# Patient Record
Sex: MALE | Race: WHITE | NOT HISPANIC OR LATINO | Employment: OTHER | URBAN - METROPOLITAN AREA
[De-identification: names, ages, dates, MRNs, and addresses within clinical notes are randomized per-mention and may not be internally consistent; named-entity substitution may affect disease eponyms.]

---

## 2024-11-06 ENCOUNTER — PREP FOR SURGERY (OUTPATIENT)
Dept: OTHER | Facility: HOSPITAL | Age: 89
End: 2024-11-06
Payer: MEDICARE

## 2024-11-06 NOTE — H&P
Hospitalist Note:    Transfer to [facility] requested by APRN at [facility] for rate controlled Afib and femoral neck fracture. Cardiology (Julio) and ortho (Johnny) agreed to see patient in consultation. Ortho would like a CT when patient gets here. Per referring APRN pt stable, Hgb 12.5, cr 1.27, CXR neg.    Electronically signed by Artem Dunham MD, 11/06/24, 4:48 PM EST.

## 2024-11-07 ENCOUNTER — APPOINTMENT (OUTPATIENT)
Dept: GENERAL RADIOLOGY | Facility: HOSPITAL | Age: 89
End: 2024-11-07
Payer: MEDICARE

## 2024-11-07 ENCOUNTER — ANESTHESIA (OUTPATIENT)
Dept: PERIOP | Facility: HOSPITAL | Age: 89
End: 2024-11-07
Payer: MEDICARE

## 2024-11-07 ENCOUNTER — HOSPITAL ENCOUNTER (INPATIENT)
Facility: HOSPITAL | Age: 89
LOS: 5 days | Discharge: ANOTHER HOSPITAL | End: 2024-11-12
Attending: INTERNAL MEDICINE | Admitting: FAMILY MEDICINE
Payer: MEDICARE

## 2024-11-07 ENCOUNTER — PREP FOR SURGERY (OUTPATIENT)
Dept: OTHER | Facility: HOSPITAL | Age: 89
End: 2024-11-07
Payer: MEDICARE

## 2024-11-07 ENCOUNTER — APPOINTMENT (OUTPATIENT)
Dept: CT IMAGING | Facility: HOSPITAL | Age: 89
End: 2024-11-07
Payer: MEDICARE

## 2024-11-07 ENCOUNTER — ANESTHESIA EVENT (OUTPATIENT)
Dept: PERIOP | Facility: HOSPITAL | Age: 89
End: 2024-11-07
Payer: MEDICARE

## 2024-11-07 ENCOUNTER — APPOINTMENT (OUTPATIENT)
Dept: CARDIOLOGY | Facility: HOSPITAL | Age: 89
End: 2024-11-07
Payer: MEDICARE

## 2024-11-07 DIAGNOSIS — R26.2 DIFFICULTY IN WALKING: Primary | ICD-10-CM

## 2024-11-07 DIAGNOSIS — F41.9 ANXIETY DISORDER, UNSPECIFIED TYPE: ICD-10-CM

## 2024-11-07 DIAGNOSIS — S72.001A CLOSED FRACTURE OF NECK OF RIGHT FEMUR, INITIAL ENCOUNTER: ICD-10-CM

## 2024-11-07 DIAGNOSIS — Z78.9 DECREASED ACTIVITIES OF DAILY LIVING (ADL): ICD-10-CM

## 2024-11-07 DIAGNOSIS — S72.001A CLOSED FRACTURE OF NECK OF RIGHT FEMUR, INITIAL ENCOUNTER: Primary | ICD-10-CM

## 2024-11-07 PROBLEM — I10 ESSENTIAL HYPERTENSION: Status: ACTIVE | Noted: 2024-11-07

## 2024-11-07 PROBLEM — S72.009A FEMORAL NECK FRACTURE: Status: ACTIVE | Noted: 2024-11-07

## 2024-11-07 PROBLEM — N40.0 BPH (BENIGN PROSTATIC HYPERPLASIA): Status: ACTIVE | Noted: 2024-11-07

## 2024-11-07 LAB
ALBUMIN SERPL-MCNC: 3 G/DL (ref 3.5–5.2)
ALBUMIN/GLOB SERPL: 1 G/DL
ALP SERPL-CCNC: 57 U/L (ref 39–117)
ALT SERPL W P-5'-P-CCNC: 11 U/L (ref 1–41)
ANION GAP SERPL CALCULATED.3IONS-SCNC: 11.7 MMOL/L (ref 5–15)
AORTIC DIMENSIONLESS INDEX: 0.43 (DI)
AST SERPL-CCNC: 22 U/L (ref 1–40)
BASOPHILS # BLD AUTO: 0.08 10*3/MM3 (ref 0–0.2)
BASOPHILS NFR BLD AUTO: 0.7 % (ref 0–1.5)
BH CV ECHO MEAS - AO MAX PG: 19.4 MMHG
BH CV ECHO MEAS - AO MEAN PG: 10.5 MMHG
BH CV ECHO MEAS - AO V2 MAX: 220.3 CM/SEC
BH CV ECHO MEAS - AO V2 VTI: 44.5 CM
BH CV ECHO MEAS - EDV(MOD-SP2): 103.4 ML
BH CV ECHO MEAS - EDV(MOD-SP4): 102.6 ML
BH CV ECHO MEAS - EF(MOD-SP2): 59.2 %
BH CV ECHO MEAS - EF(MOD-SP4): 67.9 %
BH CV ECHO MEAS - ESV(MOD-SP2): 42.2 ML
BH CV ECHO MEAS - ESV(MOD-SP4): 32.9 ML
BH CV ECHO MEAS - LAT PEAK E' VEL: 10.5 CM/SEC
BH CV ECHO MEAS - LV MAX PG: 3.2 MMHG
BH CV ECHO MEAS - LV MEAN PG: 2.2 MMHG
BH CV ECHO MEAS - LV V1 MAX: 90 CM/SEC
BH CV ECHO MEAS - LV V1 VTI: 19.2 CM
BH CV ECHO MEAS - LVOT DIAM: 2.1 CM
BH CV ECHO MEAS - MED PEAK E' VEL: 9.4 CM/SEC
BH CV ECHO MEAS - MV A MAX VEL: 106.9 CM/SEC
BH CV ECHO MEAS - MV E MAX VEL: 75.1 CM/SEC
BH CV ECHO MEAS - MV E/A: 0.7
BH CV ECHO MEAS - SV(MOD-SP2): 61.2 ML
BH CV ECHO MEAS - SV(MOD-SP4): 69.7 ML
BH CV ECHO MEAS - TAPSE (>1.6): 2.6 CM
BH CV ECHO MEASUREMENTS AVERAGE E/E' RATIO: 7.55
BH CV XLRA - TDI S': 14.9 CM/SEC
BILIRUB SERPL-MCNC: 0.8 MG/DL (ref 0–1.2)
BUN SERPL-MCNC: 35 MG/DL (ref 8–23)
BUN/CREAT SERPL: 26.1 (ref 7–25)
CALCIUM SPEC-SCNC: 8.8 MG/DL (ref 8.2–9.6)
CHLORIDE SERPL-SCNC: 102 MMOL/L (ref 98–107)
CO2 SERPL-SCNC: 21.3 MMOL/L (ref 22–29)
CREAT SERPL-MCNC: 1.34 MG/DL (ref 0.76–1.27)
DEPRECATED RDW RBC AUTO: 42.6 FL (ref 37–54)
EGFRCR SERPLBLD CKD-EPI 2021: 50.3 ML/MIN/1.73
EOSINOPHIL # BLD AUTO: 0.14 10*3/MM3 (ref 0–0.4)
EOSINOPHIL NFR BLD AUTO: 1.2 % (ref 0.3–6.2)
ERYTHROCYTE [DISTWIDTH] IN BLOOD BY AUTOMATED COUNT: 13.5 % (ref 12.3–15.4)
GLOBULIN UR ELPH-MCNC: 3.1 GM/DL
GLUCOSE SERPL-MCNC: 116 MG/DL (ref 65–99)
HCT VFR BLD AUTO: 36.1 % (ref 37.5–51)
HGB BLD-MCNC: 11.6 G/DL (ref 13–17.7)
IMM GRANULOCYTES # BLD AUTO: 0.48 10*3/MM3 (ref 0–0.05)
IMM GRANULOCYTES NFR BLD AUTO: 4.2 % (ref 0–0.5)
INR PPP: 1.17 (ref 0.86–1.15)
IVRT: 89 MS
LEFT ATRIUM VOLUME INDEX: 22.4 ML/M2
LV EF 2D ECHO EST: 55 %
LYMPHOCYTES # BLD AUTO: 0.97 10*3/MM3 (ref 0.7–3.1)
LYMPHOCYTES NFR BLD AUTO: 8.5 % (ref 19.6–45.3)
MCH RBC QN AUTO: 28 PG (ref 26.6–33)
MCHC RBC AUTO-ENTMCNC: 32.1 G/DL (ref 31.5–35.7)
MCV RBC AUTO: 87.2 FL (ref 79–97)
MONOCYTES # BLD AUTO: 1.53 10*3/MM3 (ref 0.1–0.9)
MONOCYTES NFR BLD AUTO: 13.5 % (ref 5–12)
NEUTROPHILS NFR BLD AUTO: 71.9 % (ref 42.7–76)
NEUTROPHILS NFR BLD AUTO: 8.16 10*3/MM3 (ref 1.7–7)
NRBC BLD AUTO-RTO: 0 /100 WBC (ref 0–0.2)
PLATELET # BLD AUTO: 206 10*3/MM3 (ref 140–450)
PMV BLD AUTO: 9.4 FL (ref 6–12)
POTASSIUM SERPL-SCNC: 4.1 MMOL/L (ref 3.5–5.2)
PROT SERPL-MCNC: 6.1 G/DL (ref 6–8.5)
PROTHROMBIN TIME: 15.1 SECONDS (ref 11.8–14.9)
QT INTERVAL: 425 MS
QTC INTERVAL: 453 MS
RBC # BLD AUTO: 4.14 10*6/MM3 (ref 4.14–5.8)
SODIUM SERPL-SCNC: 135 MMOL/L (ref 136–145)
WBC NRBC COR # BLD AUTO: 11.36 10*3/MM3 (ref 3.4–10.8)

## 2024-11-07 PROCEDURE — 27245 TREAT THIGH FRACTURE: CPT | Performed by: ORTHOPAEDIC SURGERY

## 2024-11-07 PROCEDURE — 25010000002 DEXAMETHASONE PER 1 MG: Performed by: MARRIAGE & FAMILY THERAPIST

## 2024-11-07 PROCEDURE — C1769 GUIDE WIRE: HCPCS | Performed by: ORTHOPAEDIC SURGERY

## 2024-11-07 PROCEDURE — 25010000002 PROPOFOL 10 MG/ML EMULSION: Performed by: MARRIAGE & FAMILY THERAPIST

## 2024-11-07 PROCEDURE — 27245 TREAT THIGH FRACTURE: CPT

## 2024-11-07 PROCEDURE — 25010000002 LIDOCAINE PF 2% 2 % SOLUTION: Performed by: MARRIAGE & FAMILY THERAPIST

## 2024-11-07 PROCEDURE — 25010000002 FENTANYL CITRATE (PF) 50 MCG/ML SOLUTION: Performed by: MARRIAGE & FAMILY THERAPIST

## 2024-11-07 PROCEDURE — 25010000002 CEFAZOLIN PER 500 MG: Performed by: ORTHOPAEDIC SURGERY

## 2024-11-07 PROCEDURE — 93005 ELECTROCARDIOGRAM TRACING: CPT | Performed by: FAMILY MEDICINE

## 2024-11-07 PROCEDURE — 71045 X-RAY EXAM CHEST 1 VIEW: CPT

## 2024-11-07 PROCEDURE — 85025 COMPLETE CBC W/AUTO DIFF WBC: CPT | Performed by: FAMILY MEDICINE

## 2024-11-07 PROCEDURE — 25810000003 LACTATED RINGERS PER 1000 ML: Performed by: FAMILY MEDICINE

## 2024-11-07 PROCEDURE — 0QS636Z REPOSITION RIGHT UPPER FEMUR WITH INTRAMEDULLARY INTERNAL FIXATION DEVICE, PERCUTANEOUS APPROACH: ICD-10-PCS | Performed by: ORTHOPAEDIC SURGERY

## 2024-11-07 PROCEDURE — C1713 ANCHOR/SCREW BN/BN,TIS/BN: HCPCS | Performed by: ORTHOPAEDIC SURGERY

## 2024-11-07 PROCEDURE — 99222 1ST HOSP IP/OBS MODERATE 55: CPT | Performed by: ORTHOPAEDIC SURGERY

## 2024-11-07 PROCEDURE — 80053 COMPREHEN METABOLIC PANEL: CPT | Performed by: FAMILY MEDICINE

## 2024-11-07 PROCEDURE — 93306 TTE W/DOPPLER COMPLETE: CPT

## 2024-11-07 PROCEDURE — 94799 UNLISTED PULMONARY SVC/PX: CPT

## 2024-11-07 PROCEDURE — 25010000002 ONDANSETRON PER 1 MG: Performed by: MARRIAGE & FAMILY THERAPIST

## 2024-11-07 PROCEDURE — 85610 PROTHROMBIN TIME: CPT | Performed by: FAMILY MEDICINE

## 2024-11-07 PROCEDURE — 25010000002 SUGAMMADEX 200 MG/2ML SOLUTION: Performed by: MARRIAGE & FAMILY THERAPIST

## 2024-11-07 PROCEDURE — 76000 FLUOROSCOPY <1 HR PHYS/QHP: CPT

## 2024-11-07 PROCEDURE — 99223 1ST HOSP IP/OBS HIGH 75: CPT | Performed by: FAMILY MEDICINE

## 2024-11-07 PROCEDURE — 93306 TTE W/DOPPLER COMPLETE: CPT | Performed by: SPECIALIST

## 2024-11-07 PROCEDURE — 25010000002 HYDROMORPHONE 1 MG/ML SOLUTION: Performed by: MARRIAGE & FAMILY THERAPIST

## 2024-11-07 PROCEDURE — 73700 CT LOWER EXTREMITY W/O DYE: CPT

## 2024-11-07 PROCEDURE — 25810000003 LACTATED RINGERS PER 1000 ML: Performed by: ANESTHESIOLOGY

## 2024-11-07 PROCEDURE — 25810000003 LACTATED RINGERS PER 1000 ML: Performed by: ORTHOPAEDIC SURGERY

## 2024-11-07 DEVICE — ZNN CMN NAIL 13MMX21.5CM 125R
Type: IMPLANTABLE DEVICE | Site: HIP | Status: FUNCTIONAL
Brand: ZIMMER® NATURAL NAIL® SYSTEM

## 2024-11-07 DEVICE — SCRW CORT FA FUL/THRD HEX3.5 TI 5X40MM: Type: IMPLANTABLE DEVICE | Site: HIP | Status: FUNCTIONAL

## 2024-11-07 DEVICE — ZNN CMN LAG SCREW 10.5X110
Type: IMPLANTABLE DEVICE | Site: HIP | Status: FUNCTIONAL
Brand: ZIMMER® NATURAL NAIL® SYSTEM

## 2024-11-07 RX ORDER — ESCITALOPRAM OXALATE 10 MG/1
10 TABLET ORAL DAILY
COMMUNITY

## 2024-11-07 RX ORDER — SODIUM CHLORIDE 0.9 % (FLUSH) 0.9 %
10 SYRINGE (ML) INJECTION EVERY 12 HOURS SCHEDULED
Status: DISCONTINUED | OUTPATIENT
Start: 2024-11-07 | End: 2024-11-12 | Stop reason: HOSPADM

## 2024-11-07 RX ORDER — MORPHINE SULFATE 2 MG/ML
1 INJECTION, SOLUTION INTRAMUSCULAR; INTRAVENOUS EVERY 4 HOURS PRN
Status: DISCONTINUED | OUTPATIENT
Start: 2024-11-07 | End: 2024-11-12 | Stop reason: HOSPADM

## 2024-11-07 RX ORDER — NALOXONE HCL 0.4 MG/ML
0.4 VIAL (ML) INJECTION
Status: DISCONTINUED | OUTPATIENT
Start: 2024-11-07 | End: 2024-11-12 | Stop reason: HOSPADM

## 2024-11-07 RX ORDER — ONDANSETRON 2 MG/ML
4 INJECTION INTRAMUSCULAR; INTRAVENOUS ONCE AS NEEDED
Status: DISCONTINUED | OUTPATIENT
Start: 2024-11-07 | End: 2024-11-07 | Stop reason: HOSPADM

## 2024-11-07 RX ORDER — TAMSULOSIN HYDROCHLORIDE 0.4 MG/1
0.4 CAPSULE ORAL NIGHTLY
Status: DISCONTINUED | OUTPATIENT
Start: 2024-11-07 | End: 2024-11-12 | Stop reason: HOSPADM

## 2024-11-07 RX ORDER — OXYCODONE HYDROCHLORIDE 5 MG/1
5 TABLET ORAL
Status: DISCONTINUED | OUTPATIENT
Start: 2024-11-07 | End: 2024-11-07 | Stop reason: HOSPADM

## 2024-11-07 RX ORDER — MEPERIDINE HYDROCHLORIDE 25 MG/ML
12.5 INJECTION INTRAMUSCULAR; INTRAVENOUS; SUBCUTANEOUS
Status: DISCONTINUED | OUTPATIENT
Start: 2024-11-07 | End: 2024-11-07 | Stop reason: HOSPADM

## 2024-11-07 RX ORDER — BISACODYL 10 MG
10 SUPPOSITORY, RECTAL RECTAL DAILY PRN
Status: DISCONTINUED | OUTPATIENT
Start: 2024-11-07 | End: 2024-11-07

## 2024-11-07 RX ORDER — SODIUM CHLORIDE 0.9 % (FLUSH) 0.9 %
10 SYRINGE (ML) INJECTION AS NEEDED
Status: DISCONTINUED | OUTPATIENT
Start: 2024-11-07 | End: 2024-11-07 | Stop reason: HOSPADM

## 2024-11-07 RX ORDER — SODIUM CHLORIDE, SODIUM LACTATE, POTASSIUM CHLORIDE, CALCIUM CHLORIDE 600; 310; 30; 20 MG/100ML; MG/100ML; MG/100ML; MG/100ML
75 INJECTION, SOLUTION INTRAVENOUS CONTINUOUS
Status: ACTIVE | OUTPATIENT
Start: 2024-11-07 | End: 2024-11-07

## 2024-11-07 RX ORDER — PROPOFOL 10 MG/ML
VIAL (ML) INTRAVENOUS AS NEEDED
Status: DISCONTINUED | OUTPATIENT
Start: 2024-11-07 | End: 2024-11-07 | Stop reason: SURG

## 2024-11-07 RX ORDER — KETOROLAC TROMETHAMINE 15 MG/ML
15 INJECTION, SOLUTION INTRAMUSCULAR; INTRAVENOUS EVERY 6 HOURS PRN
Status: DISCONTINUED | OUTPATIENT
Start: 2024-11-07 | End: 2024-11-08

## 2024-11-07 RX ORDER — AMOXICILLIN 250 MG
2 CAPSULE ORAL 2 TIMES DAILY PRN
Status: DISCONTINUED | OUTPATIENT
Start: 2024-11-07 | End: 2024-11-12 | Stop reason: HOSPADM

## 2024-11-07 RX ORDER — BISACODYL 10 MG
10 SUPPOSITORY, RECTAL RECTAL DAILY PRN
Status: DISCONTINUED | OUTPATIENT
Start: 2024-11-07 | End: 2024-11-12 | Stop reason: HOSPADM

## 2024-11-07 RX ORDER — SODIUM CHLORIDE 9 MG/ML
40 INJECTION, SOLUTION INTRAVENOUS AS NEEDED
Status: DISCONTINUED | OUTPATIENT
Start: 2024-11-07 | End: 2024-11-12 | Stop reason: HOSPADM

## 2024-11-07 RX ORDER — POLYETHYLENE GLYCOL 3350 17 G/17G
17 POWDER, FOR SOLUTION ORAL DAILY PRN
Status: DISCONTINUED | OUTPATIENT
Start: 2024-11-07 | End: 2024-11-07

## 2024-11-07 RX ORDER — ONDANSETRON 4 MG/1
4 TABLET, ORALLY DISINTEGRATING ORAL EVERY 6 HOURS PRN
Status: DISCONTINUED | OUTPATIENT
Start: 2024-11-07 | End: 2024-11-12 | Stop reason: HOSPADM

## 2024-11-07 RX ORDER — PROMETHAZINE HYDROCHLORIDE 12.5 MG/1
25 TABLET ORAL ONCE AS NEEDED
Status: DISCONTINUED | OUTPATIENT
Start: 2024-11-07 | End: 2024-11-07 | Stop reason: HOSPADM

## 2024-11-07 RX ORDER — ONDANSETRON 2 MG/ML
4 INJECTION INTRAMUSCULAR; INTRAVENOUS EVERY 6 HOURS PRN
Status: DISCONTINUED | OUTPATIENT
Start: 2024-11-07 | End: 2024-11-12 | Stop reason: HOSPADM

## 2024-11-07 RX ORDER — BISACODYL 5 MG/1
5 TABLET, DELAYED RELEASE ORAL DAILY PRN
Status: DISCONTINUED | OUTPATIENT
Start: 2024-11-07 | End: 2024-11-07

## 2024-11-07 RX ORDER — MIDAZOLAM HYDROCHLORIDE 2 MG/2ML
2 INJECTION, SOLUTION INTRAMUSCULAR; INTRAVENOUS ONCE
Status: DISCONTINUED | OUTPATIENT
Start: 2024-11-07 | End: 2024-11-07 | Stop reason: HOSPADM

## 2024-11-07 RX ORDER — MORPHINE SULFATE 2 MG/ML
2 INJECTION, SOLUTION INTRAMUSCULAR; INTRAVENOUS
Status: DISCONTINUED | OUTPATIENT
Start: 2024-11-07 | End: 2024-11-07

## 2024-11-07 RX ORDER — SODIUM CHLORIDE 9 MG/ML
40 INJECTION, SOLUTION INTRAVENOUS AS NEEDED
Status: DISCONTINUED | OUTPATIENT
Start: 2024-11-07 | End: 2024-11-07 | Stop reason: HOSPADM

## 2024-11-07 RX ORDER — ONDANSETRON 2 MG/ML
INJECTION INTRAMUSCULAR; INTRAVENOUS AS NEEDED
Status: DISCONTINUED | OUTPATIENT
Start: 2024-11-07 | End: 2024-11-07 | Stop reason: SURG

## 2024-11-07 RX ORDER — BISACODYL 5 MG/1
5 TABLET, DELAYED RELEASE ORAL DAILY PRN
Status: DISCONTINUED | OUTPATIENT
Start: 2024-11-07 | End: 2024-11-12 | Stop reason: HOSPADM

## 2024-11-07 RX ORDER — AMOXICILLIN 250 MG
2 CAPSULE ORAL 2 TIMES DAILY
Status: DISCONTINUED | OUTPATIENT
Start: 2024-11-07 | End: 2024-11-12 | Stop reason: HOSPADM

## 2024-11-07 RX ORDER — PROMETHAZINE HYDROCHLORIDE 25 MG/1
25 SUPPOSITORY RECTAL ONCE AS NEEDED
Status: DISCONTINUED | OUTPATIENT
Start: 2024-11-07 | End: 2024-11-07 | Stop reason: HOSPADM

## 2024-11-07 RX ORDER — HEPARIN SODIUM 5000 [USP'U]/ML
5000 INJECTION, SOLUTION INTRAVENOUS; SUBCUTANEOUS EVERY 12 HOURS SCHEDULED
Status: DISCONTINUED | OUTPATIENT
Start: 2024-11-07 | End: 2024-11-08

## 2024-11-07 RX ORDER — DEXAMETHASONE SODIUM PHOSPHATE 4 MG/ML
INJECTION, SOLUTION INTRA-ARTICULAR; INTRALESIONAL; INTRAMUSCULAR; INTRAVENOUS; SOFT TISSUE AS NEEDED
Status: DISCONTINUED | OUTPATIENT
Start: 2024-11-07 | End: 2024-11-07 | Stop reason: SURG

## 2024-11-07 RX ORDER — FUROSEMIDE 20 MG/1
20 TABLET ORAL DAILY
COMMUNITY

## 2024-11-07 RX ORDER — FENTANYL CITRATE 50 UG/ML
INJECTION, SOLUTION INTRAMUSCULAR; INTRAVENOUS AS NEEDED
Status: DISCONTINUED | OUTPATIENT
Start: 2024-11-07 | End: 2024-11-07 | Stop reason: SURG

## 2024-11-07 RX ORDER — LISINOPRIL 5 MG/1
5 TABLET ORAL DAILY
COMMUNITY
End: 2024-11-12 | Stop reason: HOSPADM

## 2024-11-07 RX ORDER — TAMSULOSIN HYDROCHLORIDE 0.4 MG/1
1 CAPSULE ORAL DAILY
COMMUNITY

## 2024-11-07 RX ORDER — SODIUM CHLORIDE 0.9 % (FLUSH) 0.9 %
10 SYRINGE (ML) INJECTION AS NEEDED
Status: DISCONTINUED | OUTPATIENT
Start: 2024-11-07 | End: 2024-11-12 | Stop reason: HOSPADM

## 2024-11-07 RX ORDER — ACETAMINOPHEN 500 MG
1000 TABLET ORAL ONCE
Status: COMPLETED | OUTPATIENT
Start: 2024-11-07 | End: 2024-11-07

## 2024-11-07 RX ORDER — POLYETHYLENE GLYCOL 3350 17 G/17G
17 POWDER, FOR SOLUTION ORAL DAILY PRN
Status: DISCONTINUED | OUTPATIENT
Start: 2024-11-07 | End: 2024-11-12 | Stop reason: HOSPADM

## 2024-11-07 RX ORDER — ROCURONIUM BROMIDE 10 MG/ML
INJECTION, SOLUTION INTRAVENOUS AS NEEDED
Status: DISCONTINUED | OUTPATIENT
Start: 2024-11-07 | End: 2024-11-07 | Stop reason: SURG

## 2024-11-07 RX ORDER — LIDOCAINE HYDROCHLORIDE 20 MG/ML
INJECTION, SOLUTION EPIDURAL; INFILTRATION; INTRACAUDAL; PERINEURAL AS NEEDED
Status: DISCONTINUED | OUTPATIENT
Start: 2024-11-07 | End: 2024-11-07 | Stop reason: SURG

## 2024-11-07 RX ORDER — POTASSIUM CHLORIDE 750 MG/1
10 TABLET, EXTENDED RELEASE ORAL DAILY
COMMUNITY
End: 2024-11-12 | Stop reason: HOSPADM

## 2024-11-07 RX ORDER — PHENYLEPHRINE HCL IN 0.9% NACL 1 MG/10 ML
SYRINGE (ML) INTRAVENOUS AS NEEDED
Status: DISCONTINUED | OUTPATIENT
Start: 2024-11-07 | End: 2024-11-07 | Stop reason: SURG

## 2024-11-07 RX ORDER — ONDANSETRON 2 MG/ML
4 INJECTION INTRAMUSCULAR; INTRAVENOUS EVERY 6 HOURS PRN
Status: DISCONTINUED | OUTPATIENT
Start: 2024-11-07 | End: 2024-11-07

## 2024-11-07 RX ORDER — ESCITALOPRAM OXALATE 10 MG/1
10 TABLET ORAL DAILY
Status: DISCONTINUED | OUTPATIENT
Start: 2024-11-08 | End: 2024-11-12 | Stop reason: HOSPADM

## 2024-11-07 RX ORDER — SODIUM CHLORIDE, SODIUM LACTATE, POTASSIUM CHLORIDE, CALCIUM CHLORIDE 600; 310; 30; 20 MG/100ML; MG/100ML; MG/100ML; MG/100ML
20 INJECTION, SOLUTION INTRAVENOUS CONTINUOUS PRN
Status: ACTIVE | OUTPATIENT
Start: 2024-11-07 | End: 2024-11-08

## 2024-11-07 RX ADMIN — LIDOCAINE HYDROCHLORIDE 50 MG: 20 INJECTION, SOLUTION INTRAVENOUS at 10:47

## 2024-11-07 RX ADMIN — SODIUM CHLORIDE, POTASSIUM CHLORIDE, SODIUM LACTATE AND CALCIUM CHLORIDE 75 ML/HR: 600; 310; 30; 20 INJECTION, SOLUTION INTRAVENOUS at 14:39

## 2024-11-07 RX ADMIN — Medication 10 ML: at 20:52

## 2024-11-07 RX ADMIN — SODIUM CHLORIDE 2 G: 9 INJECTION, SOLUTION INTRAVENOUS at 10:48

## 2024-11-07 RX ADMIN — Medication 100 MCG: at 11:28

## 2024-11-07 RX ADMIN — Medication 100 MCG: at 11:23

## 2024-11-07 RX ADMIN — SENNOSIDES AND DOCUSATE SODIUM 2 TABLET: 50; 8.6 TABLET ORAL at 20:52

## 2024-11-07 RX ADMIN — PROPOFOL 90 MG: 10 INJECTION, EMULSION INTRAVENOUS at 10:47

## 2024-11-07 RX ADMIN — ROCURONIUM BROMIDE 50 MG: 10 INJECTION, SOLUTION INTRAVENOUS at 10:47

## 2024-11-07 RX ADMIN — DEXAMETHASONE SODIUM PHOSPHATE 4 MG: 4 INJECTION, SOLUTION INTRAMUSCULAR; INTRAVENOUS at 10:47

## 2024-11-07 RX ADMIN — SODIUM CHLORIDE, POTASSIUM CHLORIDE, SODIUM LACTATE AND CALCIUM CHLORIDE 75 ML/HR: 600; 310; 30; 20 INJECTION, SOLUTION INTRAVENOUS at 05:23

## 2024-11-07 RX ADMIN — ONDANSETRON HYDROCHLORIDE 4 MG: 2 SOLUTION INTRAMUSCULAR; INTRAVENOUS at 11:41

## 2024-11-07 RX ADMIN — FENTANYL CITRATE 50 MCG: 50 INJECTION, SOLUTION INTRAMUSCULAR; INTRAVENOUS at 10:47

## 2024-11-07 RX ADMIN — Medication 10 ML: at 14:59

## 2024-11-07 RX ADMIN — SODIUM CHLORIDE, POTASSIUM CHLORIDE, SODIUM LACTATE AND CALCIUM CHLORIDE: 600; 310; 30; 20 INJECTION, SOLUTION INTRAVENOUS at 10:38

## 2024-11-07 RX ADMIN — TAMSULOSIN HYDROCHLORIDE 0.4 MG: 0.4 CAPSULE ORAL at 20:52

## 2024-11-07 RX ADMIN — ACETAMINOPHEN 1000 MG: 500 TABLET ORAL at 09:02

## 2024-11-07 RX ADMIN — SUGAMMADEX 200 MG: 100 INJECTION, SOLUTION INTRAVENOUS at 11:41

## 2024-11-07 RX ADMIN — FENTANYL CITRATE 50 MCG: 50 INJECTION, SOLUTION INTRAMUSCULAR; INTRAVENOUS at 11:08

## 2024-11-07 RX ADMIN — HYDROMORPHONE HYDROCHLORIDE 0.5 MG: 1 INJECTION, SOLUTION INTRAMUSCULAR; INTRAVENOUS; SUBCUTANEOUS at 12:19

## 2024-11-07 NOTE — ANESTHESIA PREPROCEDURE EVALUATION
Anesthesia Evaluation     Patient summary reviewed and Nursing notes reviewed   no history of anesthetic complications:   NPO Solid Status: > 8 hours  NPO Liquid Status: > 2 hours           Airway   Mallampati: I  TM distance: >3 FB  Neck ROM: full  No difficulty expected  Dental    (+) edentulous    Pulmonary - negative pulmonary ROS and normal exam    breath sounds clear to auscultation  Cardiovascular - negative cardio ROS and normal exam  Exercise tolerance: good (4-7 METS)    ECG reviewed  Rhythm: regular  Rate: normal    (+) hypertension      Neuro/Psych- negative ROS  GI/Hepatic/Renal/Endo - negative ROS     Musculoskeletal (-) negative ROS    Abdominal    Substance History - negative use     OB/GYN negative ob/gyn ROS         Other - negative ROS  arthritis,     ROS/Med Hx Other: PAT Nursing Notes unavailable.               Latest Reference Range & Units 11/07/24 02:58   Sodium 136 - 145 mmol/L 135 (L)   Potassium 3.5 - 5.2 mmol/L 4.1   Chloride 98 - 107 mmol/L 102   CO2 22.0 - 29.0 mmol/L 21.3 (L)   Anion Gap 5.0 - 15.0 mmol/L 11.7   BUN 8 - 23 mg/dL 35 (H)   Creatinine 0.76 - 1.27 mg/dL 1.34 (H)   BUN/Creatinine Ratio 7.0 - 25.0  26.1 (H)   eGFR >60.0 mL/min/1.73 50.3 (L)   Glucose 65 - 99 mg/dL 116 (H)   Calcium 8.2 - 9.6 mg/dL 8.8   Alkaline Phosphatase 39 - 117 U/L 57   Total Protein 6.0 - 8.5 g/dL 6.1   Albumin 3.5 - 5.2 g/dL 3.0 (L)   Globulin gm/dL 3.1   A/G Ratio g/dL 1.0   AST (SGOT) 1 - 40 U/L 22   ALT (SGPT) 1 - 41 U/L 11   Total Bilirubin 0.0 - 1.2 mg/dL 0.8   (L): Data is abnormally low  (H): Data is abnormally high     Latest Reference Range & Units 11/07/24 02:58   Protime 11.8 - 14.9 Seconds 15.1 (H)   INR 0.86 - 1.15  1.17 (H)   (H): Data is abnormally high     Latest Reference Range & Units 11/07/24 02:58   Hemoglobin 13.0 - 17.7 g/dL 11.6 (L)   Hematocrit 37.5 - 51.0 % 36.1 (L)   (L): Data is abnormally low    ABNORMAL ECG - 11/7/24  Sinus rhythm  Prolonged HI interval  Inferior infarct,  old    IMPRESSION: CT Scan  No acute infiltrate is appreciated. No cardiac enlargement. Please see  above comments for further detail.         Anesthesia Plan    ASA 3     general     Reason for not using neuraxial anesthesia or peripheral nerve block: Potential Difficulty Predicted  (Patient understands anesthesia not responsible for dental damage.  Regional anesthesia options discussed; patient is agreeable to regional block or general anesthesia but due to extremely bad hearing, pt may not be ideal candidate for regional due to inability to place in ideal position    EKG reveals q waves inferiorly, may indicate old inferior infarct in the past, labs indicate slight renal insufficiency)  intravenous induction     Anesthetic plan, risks, benefits, and alternatives have been provided, discussed and informed consent has been obtained with: patient.    Use of blood products discussed with patient .    Plan discussed with CRNA.        CODE STATUS:    Level Of Support Discussed With: Patient  Code Status (Patient has no pulse and is not breathing): CPR (Attempt to Resuscitate)  Medical Interventions (Patient has pulse or is breathing): Full Support

## 2024-11-07 NOTE — ANESTHESIA POSTPROCEDURE EVALUATION
Patient: Charbel Escoto    Procedure Summary       Date: 11/07/24 Room / Location: [facility] OR 03 / [facility] MAIN OR    Anesthesia Start: 1038 Anesthesia Stop: 1159    Procedure: HIP INTERTROCHANTERIC NAILING (Right: Hip) Diagnosis:       Closed fracture of neck of right femur, initial encounter      (Closed fracture of neck of right femur, initial encounter [S72.001A])    Surgeons: Jesus Turner MD Provider: Dottie Dallas MD    Anesthesia Type: general ASA Status: 3            Anesthesia Type: general    Vitals  Vitals Value Taken Time   /86 11/07/24 1306   Temp 36.6 °C (97.9 °F) 11/07/24 1235   Pulse 72 11/07/24 1307   Resp 14 11/07/24 1300   SpO2 92 % 11/07/24 1307   Vitals shown include unfiled device data.        Post Anesthesia Care and Evaluation    Patient location during evaluation: bedside  Patient participation: complete - patient participated  Level of consciousness: awake  Pain management: adequate    Airway patency: patent  PONV Status: none  Cardiovascular status: acceptable and stable  Respiratory status: acceptable  Hydration status: acceptable

## 2024-11-07 NOTE — CONSULTS
[facility]   Consult Note    Patient Name: Charbel Escoto  : 1934  MRN: [id number]  Primary Care Physician:  Dagmar Montez DO  Referring Physician: NGUYỄN Wade  Date of admission: 2024    Subjective   Subjective     Reason for Consult/ Chief Complaint: See H&P    HPI:  Charbel Escoto is a 89 y.o. male who sustained a hip fracture sometime the last 2 weeks is difficult to understand history apparently fell 2 weeks ago had an x-ray this past Wednesday which was read as normal in [place].  Apparently was unable to walk a deformity of his lower extremity was brought to emergency room diagnosed with a displaced inotrope fracture and I was consulted after he was transferred.    Review of Systems   14 Point ROS is negative except as noted above.     Personal History     Past Medical History:   Diagnosis Date   • Arthritis    • Hypertension        History reviewed. No pertinent surgical history.    Family History: family history is not on file. Otherwise pertinent FHx was reviewed and not pertinent to current issue.    Social History:  reports that he has never smoked. He has never used smokeless tobacco. He reports that he does not drink alcohol and does not use drugs.    Home Medications:  escitalopram, furosemide, lisinopril, potassium chloride, and tamsulosin    Allergies:  No Known Allergies    Objective    Objective     Vitals:   Temp:  [98.3 °F (36.8 °C)-99.3 °F (37.4 °C)] 98.3 °F (36.8 °C)  Heart Rate:  [77-80] 80  Resp:  [18-22] 22  BP: (110-148)/(72-74) 148/72    Physical Exam:   Constitutional: Awake, alert   HENT: Atraumatic, Normocephalic   Respiratory: Nonlabored respirations    Cardiovascular: Intact peripheral pulses    Musculoskeletal: Clavicles shoulders elbows wrist and hand are nontender full range of motion right lower extremity shortened external rotated compared to left left hip knee and ankles nontender right knee and ankles nontender     Imaging:  Imaging  Results (Last 24 Hours)       Procedure Component Value Units Date/Time    XR Chest 1 View [[id number]] Collected: 11/07/24 0633     Updated: 11/07/24 0637    Narrative:      XR CHEST 1 VW-     Date of exam: 11/7/2024 5:40 AM.     Indication: Preop. right LUDIVINA; h/o fall & right femoral neck fracture.     Comparison: None available.     FINDINGS:  Two views of the chest (AP upright portable projections) reveal no  cardiac enlargement and no acute infiltrate. No pleural effusion or  pneumothorax is seen. The thoracic aorta is atherosclerotic and ectatic.  External artifacts are noted. Chronic calcified granulomatous disease of  the chest is suggested. There are degenerative changes of the shoulders  and spine.          Impression:      No acute infiltrate is appreciated. No cardiac enlargement. Please see  above comments for further detail.           Portions of this note were completed with a voice recognition program.     Electronically Signed By-Aman Mojica MD On:11/7/2024 6:35 AM       CT Lower Extremity Right Without Contrast [[id number]] Collected: 11/07/24 0433     Updated: 11/07/24 0445    Narrative:      CT LOWER EXTREMITY RIGHT WO CONTRAST-     Date of exam: 11/7/2024, 4:04 A.M.     Indications: hip fracture; Orthopedics (requesting prior to surgery)     Comparison: None available.     TECHNIQUE:   Axial CT images were obtained of the right lower extremity without  contrast administration. Reconstructed 2D coronal and sagittal images  were also obtained. Automated exposure control and iterative  construction methods were used.     FINDINGS:  There is an acute posteriorly displaced (8 mm) comminuted  extra-articular closed right femoral neck fracture. Again, the large  distal fracture fragment is displaced posteriorly approximately 8 mm.  Also, the fracture is impacted. There is a vertical fracture line  extending through the intertrochanteric region to the subtrochanteric  area; it extends approximately 4 cm  inferior to the apex of the right  lesser trochanter. There is extensive comminution at the impaction site.  There may be a tiny avulsion fracture involving the lesser trochanter  (such as seen on image 66 of series 5 and image 56 of series 4). In the  differential diagnosis would be an avulsion fracture. No dislocation is  seen. There are suspected old healed superior and inferior ischio-pubic  rami fractures on the right. Acute soft tissue contusion is centered  about the fractures involving the proximal right femur.          Impression:      There is an acute comminuted displaced right femoral neck fracture, as  discussed. It is impacted. There is a fracture line extending into the  subtrochanteric region, as detailed above. No dislocation is seen.  Please see above comments for further detail.           Portions of this note were completed with a voice recognition program.     Electronically Signed By-Aman Mojica MD On:11/7/2024 4:43 AM                Result Review    Result Review:  I have personally reviewed the results from the time of this admission to 11/7/2024 09:40 EST and agree with these findings:  []  Laboratory  []  Microbiology  []  Radiology  []  EKG/Telemetry   []  Cardiology/Vascular   []  Pathology  []  Old records  []  Other:      Assessment & Plan   Assessment / Plan     Brief Patient Summary:  Charbel Escoto is a 89 y.o. male who sustained a displaced basicervical/intertrochanteric fracture with subtrochanteric extension    Active Hospital Problems:  Active Hospital Problems    Diagnosis    • **Femoral neck fracture    • Essential hypertension    • BPH (benign prostatic hyperplasia)        Plan: Discussed with him and son difficult decision making secondary location of this fracture with extension in the central region initial impression is to consider intramedullary screw fixation will make the final decision IntraOp after traction is applied the leg possibly need a total hip arthroplasty  they understand the risk and benefits of both surgical interventions and wishes to proceed.      Electronically signed by Jesus Turner MD, 11/07/24, 9:40 AM EST.

## 2024-11-07 NOTE — CONSULTS
[facility]   Cardiology Consult Note    Patient Name: Charbel Escoto  : 1934  MRN: [id number]  Primary Care Physician:  Dagmar Montez DO  Referring Physician: NGUYỄN Wade  Date of admission: 2024    Subjective   Subjective     Reason for Consult/ Chief Complaint: Atrial fibrillation    HPI:  Charbel Escoto is a 89 y.o. male admitted with right hip fracture.  Noticed to have atrial fibrillation on monitor.  Controlled.  No previous history.  No chest pain or shortness of breath.    Review of Systems:      Constitutional no fever,  no weight loss   Skin no rash   Otolaryngeal no difficulty swallowing   Cardiovascular See HPI   Pulmonary no cough, no sputum production                         Personal History       Past Medical/Surgical History:   Past Medical History:   Diagnosis Date   • Arthritis    • Hypertension      History reviewed. No pertinent surgical history.      Family History: No Family History of CAD.    Social History:  reports that he has never smoked. He has never used smokeless tobacco. He reports that he does not drink alcohol and does not use drugs.    Medications:  Medications Prior to Admission   Medication Sig Dispense Refill Last Dose/Taking   • escitalopram (LEXAPRO) 10 MG tablet Take 1 tablet by mouth Daily.      • furosemide (LASIX) 20 MG tablet Take 1 tablet by mouth Daily.      • lisinopril (PRINIVIL,ZESTRIL) 5 MG tablet Take 1 tablet by mouth Daily.      • potassium chloride (KLOR-CON M10) 10 MEQ CR tablet Take 1 tablet by mouth Daily.      • tamsulosin (FLOMAX) 0.4 MG capsule 24 hr capsule Take 1 capsule by mouth Daily.        Current medications:  ceFAZolin, 2 g, Intravenous, Once  ceFAZolin, 3 g, Intravenous, Once  heparin (porcine), 5,000 Units, Subcutaneous, Q12H  midazolam, 2 mg, Intravenous, Once  sodium chloride, 10 mL, Intravenous, Q12H      Current IV drips:  lactated ringers, 75 mL/hr, Last Rate: 75 mL/hr (24 0523)  lactated ringers,  "20 mL/hr        Allergies:  No Known Allergies    Objective    Objective     Vitals:   Temp:  [98.3 °F (36.8 °C)-99.3 °F (37.4 °C)] 98.3 °F (36.8 °C)  Heart Rate:  [77-80] 80  Resp:  [18-22] 22  BP: (110-148)/(72-74) 148/72      Physical Exam:    Constitutional: Awake, alert, No acute distress   Eyes: PERRLA, sclerae anicteric, no conjunctival injection  HENT: NCAT, mucous membranes moist  Neck: Supple, no thyromegaly, no lymphadenopathy, trachea midline  Respiratory: Clear to auscultation bilaterally, nonlabored respirations   Cardiovascular: Irregular heart sounds, no murmurs, rubs, or gallops, palpable pedal pulses bilaterally    Result Review    Result Review:  I have personally reviewed the results from the time of this admission to 11/7/2024 09:16 EST and agree with these findings:  [x]  Laboratory  [x]  EKG/Telemetry   [x]  Cardiology/Vascular   []  Pathology  [x]  Old records  [x]  Medications  Basic Metabolic Panel    Sodium Sodium   Date Value Ref Range Status   11/07/2024 135 (L) 136 - 145 mmol/L Final      Potassium Potassium   Date Value Ref Range Status   11/07/2024 4.1 3.5 - 5.2 mmol/L Final      Chloride Chloride   Date Value Ref Range Status   11/07/2024 102 98 - 107 mmol/L Final      Bicarbonate No results found for: \"PLASMABICARB\"   BUN BUN   Date Value Ref Range Status   11/07/2024 35 (H) 8 - 23 mg/dL Final      Creatinine Creatinine   Date Value Ref Range Status   11/07/2024 1.34 (H) 0.76 - 1.27 mg/dL Final      Calcium Calcium   Date Value Ref Range Status   11/07/2024 8.8 8.2 - 9.6 mg/dL Final      Glucose      No components found for: \"GLUCOSE.*\"        No results found for: \"PROBNP\"       Telemetry shows atrial fibrillation    Assessment / Plan     Impression:   1.  Persistent atrial fibrillation controlled.  2.  Right hip fracture.    Plan:   1.  Echocardiogram.  2.  Metoprolol if heart rate runs high.  3.  Eliquis 2.5 mg twice a day postsurgery.    Electronically signed by Ervin" MD Julio, 11/07/24, 9:16 AM EST.

## 2024-11-07 NOTE — PLAN OF CARE
Goal Outcome Evaluation:  Plan of Care Reviewed With: patient        Progress: no change  Outcome Evaluation: Direct admit on shift. Patient is A&O x4. [place]. VSS. No complaints from patient on shift. Family remained at bedside. Plan of care ongoing.

## 2024-11-07 NOTE — SIGNIFICANT NOTE
11/07/24 0545   OTHER   Discipline occupational therapist   Rehab Time/Intention   Session Not Performed   (pending ortho)

## 2024-11-07 NOTE — NURSING NOTE
Per cardiologist Dr. Ervin Kim , patient is cleared by cardiac standpoint to proceed with his surgery.

## 2024-11-07 NOTE — PLAN OF CARE
Goal Outcome Evaluation:  Plan of Care Reviewed With: patient        Progress: improving  Outcome Evaluation: Pt had hip nailing done today. Pt has had no complaints of pain this shift. Pt is alert and oriented. Family is bedside. Plan of care ongoing.

## 2024-11-07 NOTE — OP NOTE
HIP INTERTROCHANTERIC NAILING  Procedure Report    Patient Name:  Charbel Escoto  YOB: 1934    Date of Surgery:  11/7/2024       Pre-op Diagnosis:   Closed fracture of neck of right femur, initial encounter [S72.001A]       Post-Op Diagnosis Codes:     * Closed fracture of neck of right femur, initial encounter [S72.001A]    Procedure/CPT® Codes:      Procedure(s):  rightHIP INTERTROCHANTERIC NAILING    Staff:  Surgeon(s):  Jesus Turner MD    Assistant: Keiry Miner CSA; Kylee lAvarez RN    Anesthesia: Choice    Estimated Blood Loss: minimal    Implants:    Implant Name Type Inv. Item Serial No.  Lot No. LRB No. Used Action   NAIL IM/FEM CEPH TI 13MM 21.5CM SHT RT STRL - [id number] Implant NAIL IM/FEM CEPH TI 13MM 21.5CM SHT RT STRL  [employer] [id number] Right 1 Implanted   SCRW LAG CEPH TI [id number] - [id number] Implant SCRW LAG CEPH TI [id number]  [employer] [id number] Right 1 Implanted   SCRW SHERRIE FA FUL/THRD HEX3.5 TI 5X40MM - [id number] Implant SCRW SHERRIE FA FUL/THRD HEX3.5 TI 5X40MM  [employer] [id number] Right 1 Implanted       Specimen:          None      Complications: none    Description of Procedure: See H&P for risks and benefits. The patient was taken to the operating room and placed supine on the operating table.  After general anesthesia was established, the patient was placed on a fracture table.  After gentle retraction was placed on the lower extremity, C-arm shot showed anatomic alignment of the fracture on AP and lateral views.  The right hip and lower extremity were prepped and draped in a standard fashion using alcohol and ChloraPrep.  A standard incision was made just proximal to the greater trochanter approximately 4 cm in length and carried down to the subcutaneous tissue with a knife.  Dissection was carried down creating a soft tissue portal using curved Engel scissors, and then the curved awl was used to gain a starting point.  The guidewire was  passed down across the fracture site, and the one step reamer was used followed by passage of the 125 degree angle CM nail.  The guidewire was removed.  After seating it in appropriate position, the CM screw was placed using the guide with a separate stab incision and drilling the guide pin in and ensuring it was in appropriate position and reaming appropriate depth.  The appropriate length CM screw was placed.  The set screw was tightened, and then the static screw was filled in a standard fashion in the distal interlocking hole after creating a separate stab incision also using the guide.  The guide was removed after C-arm shots verified anatomic reduction and fixation of the fracture and excellent placement of the implants.  No complication.  All three wounds were copiously irrigated.  The deep fascia was closed with 0 Vicryl proximally.  The subcutaneous was all closed with 2-0 Vicryl.  The skin was all closed with staples.  Incisions were washed and dried and sterile dressing applied.  The patient tolerated the procedure well and was taken to the recovery room.         Jesus Turner MD     Date: 11/7/2024  Time: 11:45 EST

## 2024-11-07 NOTE — H&P
[facility]   HISTORY AND PHYSICAL    Patient Name: Charbel Escoto  : 1934  MRN: [id number]  Primary Care Physician:  Dagmar Montez DO  Date of admission: 2024    Subjective   Subjective     Chief Complaint: Right hip pain    HPI:    Charbel Escoto is a 89 y.o. male with with past medical history of hypertension, BPH, arthritis, hearing impairment and GERD was transferred to this facility from [facility] for management of a right hip fracture.  Per son at bedside, last week the patient was chasing a chicken in his backyard and he accidentally fell to the ground hitting his right knee and his head.  Patient had a contusion to the top of his head which resolved after a few days but then he began to have severe right lower extremity pain that limited his mobility and ambulation.  Patient went to PCPs office to have x-rays done which he read as negative but the pain persisted so he was taken to outside facility was found to have a right hip fracture.  Due to lack of orthopedic surgery patient was transferred here for higher level of care.  At our facility patient's vitals were all within normal on arrival.  Labs showed reduced renal function with a mild leukocytosis and elevated INR 1.17.  CT of the right lower extremity showed an acute comminuted displaced right femoral neck fracture.  When seen patient was complaining of right lower extremity pain but denied any recent fevers, chills, headaches, chest pain, shortness of breath, dysuria, or bleeding.  Patient denied being on blood thinners.    Review of Systems   All systems were reviewed and negative except for: As per HPI    Personal History     Past Medical History:   Diagnosis Date   • Arthritis    • Hypertension        History reviewed. No pertinent surgical history.    Family History: family history is not on file. Otherwise pertinent FHx was reviewed and not pertinent to current issue.    Social History:  reports that he has never  smoked. He has never used smokeless tobacco. He reports that he does not drink alcohol and does not use drugs.    Home Medications:         Allergies:  No Known Allergies    Objective   Objective     Vitals:   Temp:  [99.3 °F (37.4 °C)] 99.3 °F (37.4 °C)  Heart Rate:  [77] 77  Resp:  [18] 18  BP: (110)/(74) 110/74  Physical Exam    Constitutional: Awake, alert   Eyes: PERRLA, sclerae anicteric, no conjunctival injection   HENT: NCAT, mucous membranes dry   Neck: Supple, no thyromegaly, no lymphadenopathy, trachea midline   Respiratory: Clear to auscultation bilaterally, nonlabored respirations    Cardiovascular: RRR, no murmurs, rubs, or gallops, palpable pedal pulses bilaterally   Gastrointestinal: Positive bowel sounds, soft, nontender, nondistended   Musculoskeletal: Limited range of motion secondary to pain, bilateral ankle edema, no clubbing or cyanosis to extremities   Psychiatric: Appropriate affect, cooperative   Neurologic: Oriented x 3, strength symmetric in all extremities, Cranial Nerves grossly intact to confrontation, speech clear   Skin: No rashes     Result Review    Result Review:  I have personally reviewed the results from the time of this admission to 11/7/2024 05:08 EST and agree with these findings:  [x]  Laboratory list / accordion  []  Microbiology  [x]  Radiology  [x]  EKG/Telemetry   []  Cardiology/Vascular   []  Pathology  []  Old records  []  Other:  Most notable findings include: EKG showed sinus rhythm with prolonged UT interval but no significant ST changes, leukocytosis, reduced renal function, elevated INR      Assessment & Plan   Assessment / Plan     Brief Patient Summary:  Charbel Escoto is a 89 y.o. male with with past medical history of hypertension, BPH, arthritis, hearing impairment and GERD was transferred to this facility from [facility] for management of a right hip fracture.    Active Hospital Problems:  Active Hospital Problems    Diagnosis    • **Femoral neck  fracture    • Essential hypertension    • BPH (benign prostatic hyperplasia)      Plan:     Right Hip Fracture  -Admit to medical floor  -Imaging reviewed  -Pain Meds PRN  -Orthopedic Surgery Consulted  -NPO after midnight  -IVF  -PT/OT  -CBC, CMP, INR, EKG  -Cardiology risk assessment  -Supportive care    HTN  -Currently well controlled  -PRN BP meds  -Resume home meds when available  -Titrate if needed    BPH  Hearing impairment    GI ppx  DVT ppx      VTE Prophylaxis:  Pharmacologic VTE prophylaxis orders are present.        CODE STATUS:    Level Of Support Discussed With: Patient  Code Status (Patient has no pulse and is not breathing): CPR (Attempt to Resuscitate)  Medical Interventions (Patient has pulse or is breathing): Full Support    Admission Status:  I believe this patient meets inpatient status.      Electronically signed by Reginald Reid MD, 11/07/24, 5:08 AM EST.

## 2024-11-07 NOTE — PROGRESS NOTES
Patient seen and evaluated following right hip intertrochanteric nailing.  Patient difficult to awaken but once awake is alert conversant oriented to person alone.  Patient very hard of hearing.  Afebrile heart rate within normal limits.  A-fib 60s to 80s on telemetry reviewed.  Blood pressure within normal limits.  Satting well on 2 L nasal cannula postoperatively.  Transthoracic echocardiogram demonstrated normal left ventricular ejection fraction with mild aortic stenosis.      PT OT to evaluate and treat.    Continue pain control with p.o. analgesics IV for breakthrough.    Weightbearing and dressing changes per orthopedics  Continue home escitalopram  Hold home furosemide and lisinopril restart as needed  Continue home tamsulosin will attempt voiding trial in a.m.  Repeat H&H and renal panel in a.m.  Avoid nephrotoxic's and NSAIDs  Further inpatient orders recommendations pending clinical course

## 2024-11-08 LAB
ANION GAP SERPL CALCULATED.3IONS-SCNC: 8 MMOL/L (ref 5–15)
BUN SERPL-MCNC: 43 MG/DL (ref 8–23)
BUN/CREAT SERPL: 27.6 (ref 7–25)
CALCIUM SPEC-SCNC: 8.5 MG/DL (ref 8.2–9.6)
CHLORIDE SERPL-SCNC: 101 MMOL/L (ref 98–107)
CO2 SERPL-SCNC: 24 MMOL/L (ref 22–29)
CREAT SERPL-MCNC: 1.56 MG/DL (ref 0.76–1.27)
EGFRCR SERPLBLD CKD-EPI 2021: 41.9 ML/MIN/1.73
GLUCOSE SERPL-MCNC: 120 MG/DL (ref 65–99)
HCT VFR BLD AUTO: 34.3 % (ref 37.5–51)
HGB BLD-MCNC: 10.8 G/DL (ref 13–17.7)
POTASSIUM SERPL-SCNC: 4.5 MMOL/L (ref 3.5–5.2)
SODIUM SERPL-SCNC: 133 MMOL/L (ref 136–145)

## 2024-11-08 PROCEDURE — 97161 PT EVAL LOW COMPLEX 20 MIN: CPT

## 2024-11-08 PROCEDURE — 99232 SBSQ HOSP IP/OBS MODERATE 35: CPT | Performed by: SPECIALIST

## 2024-11-08 PROCEDURE — 25010000002 HEPARIN (PORCINE) PER 1000 UNITS: Performed by: ORTHOPAEDIC SURGERY

## 2024-11-08 PROCEDURE — 80048 BASIC METABOLIC PNL TOTAL CA: CPT | Performed by: ORTHOPAEDIC SURGERY

## 2024-11-08 PROCEDURE — 85018 HEMOGLOBIN: CPT | Performed by: ORTHOPAEDIC SURGERY

## 2024-11-08 PROCEDURE — 99232 SBSQ HOSP IP/OBS MODERATE 35: CPT | Performed by: FAMILY MEDICINE

## 2024-11-08 PROCEDURE — 85014 HEMATOCRIT: CPT | Performed by: ORTHOPAEDIC SURGERY

## 2024-11-08 PROCEDURE — 97165 OT EVAL LOW COMPLEX 30 MIN: CPT

## 2024-11-08 RX ORDER — ALPRAZOLAM 0.25 MG/1
0.25 TABLET ORAL 3 TIMES DAILY PRN
Status: DISCONTINUED | OUTPATIENT
Start: 2024-11-08 | End: 2024-11-08

## 2024-11-08 RX ORDER — NICOTINE 21 MG/24HR
1 PATCH, TRANSDERMAL 24 HOURS TRANSDERMAL
Status: DISCONTINUED | OUTPATIENT
Start: 2024-11-08 | End: 2024-11-12 | Stop reason: HOSPADM

## 2024-11-08 RX ORDER — ALPRAZOLAM 0.25 MG/1
0.5 TABLET ORAL 4 TIMES DAILY PRN
Status: DISCONTINUED | OUTPATIENT
Start: 2024-11-08 | End: 2024-11-12 | Stop reason: HOSPADM

## 2024-11-08 RX ORDER — POLYETHYLENE GLYCOL 3350 17 G
2 POWDER IN PACKET (EA) ORAL
Status: DISCONTINUED | OUTPATIENT
Start: 2024-11-08 | End: 2024-11-12 | Stop reason: HOSPADM

## 2024-11-08 RX ORDER — HYDROCODONE BITARTRATE AND ACETAMINOPHEN 5; 325 MG/1; MG/1
1 TABLET ORAL EVERY 6 HOURS PRN
Status: DISCONTINUED | OUTPATIENT
Start: 2024-11-08 | End: 2024-11-12 | Stop reason: HOSPADM

## 2024-11-08 RX ADMIN — HEPARIN SODIUM 5000 UNITS: 5000 INJECTION INTRAVENOUS; SUBCUTANEOUS at 08:36

## 2024-11-08 RX ADMIN — Medication 10 ML: at 20:28

## 2024-11-08 RX ADMIN — ALPRAZOLAM 0.5 MG: 0.25 TABLET ORAL at 20:28

## 2024-11-08 RX ADMIN — NICOTINE 1 PATCH: 21 PATCH, EXTENDED RELEASE TRANSDERMAL at 15:37

## 2024-11-08 RX ADMIN — Medication 10 ML: at 08:37

## 2024-11-08 RX ADMIN — APIXABAN 2.5 MG: 2.5 TABLET, FILM COATED ORAL at 20:28

## 2024-11-08 RX ADMIN — SENNOSIDES AND DOCUSATE SODIUM 2 TABLET: 50; 8.6 TABLET ORAL at 08:37

## 2024-11-08 RX ADMIN — SENNOSIDES AND DOCUSATE SODIUM 2 TABLET: 50; 8.6 TABLET ORAL at 20:28

## 2024-11-08 RX ADMIN — ESCITALOPRAM OXALATE 10 MG: 10 TABLET ORAL at 08:36

## 2024-11-08 RX ADMIN — TAMSULOSIN HYDROCHLORIDE 0.4 MG: 0.4 CAPSULE ORAL at 20:28

## 2024-11-08 RX ADMIN — ALPRAZOLAM 0.25 MG: 0.25 TABLET ORAL at 12:47

## 2024-11-08 NOTE — PLAN OF CARE
Goal Outcome Evaluation:  Plan of Care Reviewed With: patient           Outcome Evaluation: Pt presents with decreased ROM, strength, transfers and ambulation.  Skilled PT services will be required to address these mobility deficits.    Anticipated Discharge Disposition (PT): skilled nursing facility

## 2024-11-08 NOTE — PLAN OF CARE
Goal Outcome Evaluation:  Plan of Care Reviewed With: patient           Outcome Evaluation: Patient has experienced decline in function from baseline status, presenting with deficits related to balance, strength, safety awareness, transfers and mobility that impede patient independence with activities of daily living.  Patient would benefit from skilled Occupational Therapy intervention to maximize patient safety, and promote return to baseline independence.    Anticipated Discharge Disposition (OT): skilled nursing facility

## 2024-11-08 NOTE — PROGRESS NOTES
[facility]   Hospitalist Progress Note  Date: 2024  Patient Name: Charbel Escoto  : 1934  MRN: [id number]  Date of admission: 2024      Subjective   Subjective     Chief Complaint: Follow-up right hip pain    Summary:Charbel Escoto is a 89 y.o. male with with past medical history of hypertension, BPH, arthritis, hearing impairment and GERD was transferred to this facility from [facility] for management of a right hip fracture.  Per son at bedside, last week the patient was chasing a chicken in his backyard and he accidentally fell to the ground hitting his right knee and his head.  Patient had a contusion to the top of his head which resolved after a few days but then he began to have severe right lower extremity pain that limited his mobility and ambulation.  Patient went to PCPs office to have x-rays done which he read as negative but the pain persisted so he was taken to outside facility was found to have a right hip fracture.  Due to lack of orthopedic surgery patient was transferred here for higher level of care.  At our facility patient's vitals were all within normal on arrival.  Labs showed reduced renal function with a mild leukocytosis and elevated INR 1.17.  CT of the right lower extremity showed an acute comminuted displaced right femoral neck fracture.  Patient admitted for further evaluation and treatment.  Orthopedics consulted.  Cardiology consulted for preop clearance.  Patient underwent right hip intertrochanteric nailing.  Tolerated procedure well.  Seen by physical therapy occupational therapy recommending skilled nursing rehab.  Discharge planning coordinating.  Pre-CERT pending at [facility].    Interval Followup: Patient sitting of the bedside chair appears to be resting comfortably with family at the bedside.  Patient eager to return home.  Discussed with patient and family physical therapy's recommendations for skilled nursing rehab.  Family is amenable.  Patient  chews tobacco chronically.  Provided supplemental nicotine.  Patient remains very anxious started on low-dose as needed Xanax.  Afebrile overnight.  Sinus rhythm PVCs 60s to 80s on telemetry review.  Blood pressure within normal limits.  Satting well on room air.  Serum creatinine trending up.  Hemoglobin trending down slightly.  No other issues per nursing.      Review of Systems  Constitutional: Negative for fatigue and fever.   HENT: Negative for sore throat and trouble swallowing.    Eyes: Negative for pain and discharge.   Respiratory: Negative for cough and shortness of breath.    Cardiovascular: Negative for chest pain and palpitations.   Gastrointestinal: Negative for abdominal pain, nausea and vomiting.   Endocrine: Negative for cold intolerance and heat intolerance.   Genitourinary: Negative for difficulty urinating and dysuria.   Musculoskeletal: Negative for back pain and neck stiffness.  Right hip pain  Skin: Negative for color change and rash.   Neurological: Negative for syncope and headaches.   Hematological: Negative for adenopathy.   Psychiatric/Behavioral: Negative for confusion and hallucinations.    Objective   Objective     Vitals:   Temp:  [98.1 °F (36.7 °C)-98.7 °F (37.1 °C)] 98.6 °F (37 °C)  Heart Rate:  [69-79] 77  Resp:  [15-18] 16  BP: ()/(55-91) 125/91  Flow (L/min) (Oxygen Therapy):  [2] 2  Physical Exam   General: well-developed appearing stated age in no acute distress  HEENT: Normocephalic atraumatic moist membranes pupils equal round reactive light, no scleral icterus no conjunctival injection  Cardiovascular: regular rate and rhythm no murmurs rubs or gallops S1-S2, no lower extremity edema appreciated  Pulmonary: Clear to auscultation bilaterally no wheezes rales or rhonchi symmetric chest expansion, unlabored, no conversational dyspnea appreciated  Gastrointestinal: Soft nontender nondistended positive bowel sounds all 4 quadrants no rebound or guarding  Musculoskeletal: No  clubbing cyanosis, warm and well-perfused, calves soft symmetric nontender bilaterally  Skin: Clean dry without rashes  Neuro: Cranial nerves II through XII intact grossly no sensorimotor deficits appreciated bilateral upper and lower extremities  Psych: Patient is calm cooperative and appropriate with exam not responding to internal stimuli  : No Zavala catheter no bladder distention no suprapubic tenderness    Result Review    Result Review:  I have personally reviewed these results and agree with these findings:  [x]  Laboratory  LAB RESULTS:      Lab 11/08/24  0411 11/07/24  0258   WBC  --  11.36*   HEMOGLOBIN 10.8* 11.6*   HEMATOCRIT 34.3* 36.1*   PLATELETS  --  206   NEUTROS ABS  --  8.16*   IMMATURE GRANS (ABS)  --  0.48*   LYMPHS ABS  --  0.97   MONOS ABS  --  1.53*   EOS ABS  --  0.14   MCV  --  87.2   PROTIME  --  15.1*         Lab 11/08/24  0411 11/07/24  0258   SODIUM 133* 135*   POTASSIUM 4.5 4.1   CHLORIDE 101 102   CO2 24.0 21.3*   ANION GAP 8.0 11.7   BUN 43* 35*   CREATININE 1.56* 1.34*   EGFR 41.9* 50.3*   GLUCOSE 120* 116*   CALCIUM 8.5 8.8         Lab 11/07/24  0258   TOTAL PROTEIN 6.1   ALBUMIN 3.0*   GLOBULIN 3.1   ALT (SGPT) 11   AST (SGOT) 22   BILIRUBIN 0.8   ALK PHOS 57         Lab 11/07/24 0258   PROTIME 15.1*   INR 1.17*                 Brief Urine Lab Results       None          Microbiology Results (last 10 days)       ** No results found for the last 240 hours. **            []  Microbiology  [x]  Radiology  XR Chest 1 View    Result Date: 11/7/2024  No acute infiltrate is appreciated. No cardiac enlargement. Please see above comments for further detail.    Portions of this note were completed with a voice recognition program.  Electronically Signed By-Aman Mojica MD On:11/7/2024 6:35 AM      CT Lower Extremity Right Without Contrast    Result Date: 11/7/2024  There is an acute comminuted displaced right femoral neck fracture, as discussed. It is impacted. There is a fracture line  extending into the subtrochanteric region, as detailed above. No dislocation is seen. Please see above comments for further detail.    Portions of this note were completed with a voice recognition program.  Electronically Signed By-Aman Mojica MD On:11/7/2024 4:43 AM       [x]  EKG/Telemetry   [x]  Cardiology/Vascular   Transthoracic echocardiogram 11/7/2024  Normal left ventricular systolic function.  Mild aortic stenosis.  []  Pathology  []  Old records  [x]  Other:  Scheduled Meds:escitalopram, 10 mg, Oral, Daily  heparin (porcine), 5,000 Units, Subcutaneous, Q12H  nicotine, 1 patch, Transdermal, Q24H  senna-docusate sodium, 2 tablet, Oral, BID  sodium chloride, 10 mL, Intravenous, Q12H  sodium chloride, 10 mL, Intravenous, Q12H  tamsulosin, 0.4 mg, Oral, Nightly      Continuous Infusions:   PRN Meds:.•  ALPRAZolam  •  senna-docusate sodium **AND** polyethylene glycol **AND** bisacodyl **AND** bisacodyl  •  HYDROcodone-acetaminophen  •  [Held by provider] ketorolac  •  metoprolol tartrate  •  Morphine **AND** naloxone  •  nicotine polacrilex  •  ondansetron ODT **OR** ondansetron  •  senna-docusate sodium **AND** [DISCONTINUED] polyethylene glycol **AND** [DISCONTINUED] bisacodyl **AND** [DISCONTINUED] bisacodyl  •  sodium chloride  •  sodium chloride  •  sodium chloride  •  sodium chloride      Assessment & Plan   Assessment / Plan     Assessment/Plan:  Acute comminuted displaced right femoral neck fracture  Paroxysmal A-fib  BPH  Renal insufficiency unknown chronicity  Normocytic anemia      Patient admitted for further evaluation and treatment  Orthopedics consulted thank you for your recommendations  Status post right hip intertrochanteric nailing  Continue pain control with oral analgesics IV for breakthrough  Continue to encourage early activity  Continue to encourage incentive spirometry  Continue physical therapy Occupational Therapy  Continue dressing changes per orthopedics  Start Eliquis 2.5 mg  twice daily  Continue tamsulosin  Repeat renal panel in a.m.  Continue avoid nephrotoxics  Repeat CBC in a.m.  Get iron panel and ferritin in a.m.  Further inpatient orders recommendations pending clinical course        Discussed plan with bedside RN.    Disposition: Pre-CERT pending at [facility] and rehab    VTE Prophylaxis:  Pharmacologic VTE prophylaxis orders are present.        CODE STATUS:   Level Of Support Discussed With: Patient  Code Status (Patient has no pulse and is not breathing): CPR (Attempt to Resuscitate)  Medical Interventions (Patient has pulse or is breathing): Full Support

## 2024-11-08 NOTE — PLAN OF CARE
Goal Outcome Evaluation:  Plan of Care Reviewed With: patient        Progress: no change  Outcome Evaluation: Pt alert and oriented. Pt hearing bilaterally diminished. MD ordered prn xanax to help with anxiety. Pt has had no complaints of pain during shift. Family remains at bedside. Safetly checks maintained. Call light and personal items within easy reach

## 2024-11-08 NOTE — THERAPY EVALUATION
Acute Care - Physical Therapy Initial Evaluation   [place]     Patient Name: Charbel Escoto  : 1934  MRN: [id number]  Today's Date: 2024     Admit date: 2024     Referring Physician: Jak Siegel MD     Surgery Date:2024   Procedure(s) (LRB):  HIP INTERTROCHANTERIC NAILING (Right)          Visit Dx:     ICD-10-CM ICD-9-CM   1. Difficulty in walking  R26.2 719.7   2. Closed fracture of neck of right femur, initial encounter  S72.001A 820.8     Patient Active Problem List   Diagnosis    Femoral neck fracture    Essential hypertension    BPH (benign prostatic hyperplasia)     Past Medical History:   Diagnosis Date    Arthritis     Hypertension      Past Surgical History:   Procedure Laterality Date    HIP INTERTROCHANTERIC NAILING Right 2024    Procedure: HIP INTERTROCHANTERIC NAILING;  Surgeon: Jesus Turner MD;  Location: [facility] OR;  Service: Orthopedics;  Laterality: Right;     PT Assessment (Last 12 Hours)       PT Evaluation and Treatment       Row Name 24          Physical Therapy Time and Intention    Subjective Information complains of;pain  -MEÑO     Document Type evaluation  -MEÑO     Mode of Treatment individual therapy;physical therapy  -MEÑO     Patient Effort good  -MEÑO     Symptoms Noted During/After Treatment none  -MEÑO       Row Name 24          General Information    Patient Observations alert;cooperative;agree to therapy  -MEÑO     Prior Level of Function independent:;all household mobility;community mobility  -MEÑO     Equipment Currently Used at Home none  -MEÑO     Existing Precautions/Restrictions fall;weight bearing  -MEÑO     Barriers to Rehab none identified  -MEÑO       Row Name 24          Living Environment    Current Living Arrangements home  -MEÑO     People in Home grandchild(melita)  -MEÑO       Row Name 24          Cognition    Orientation Status (Cognition) oriented x 3  -MEÑO       Row Name 24          Range of  Motion Comprehensive    General Range of Motion lower extremity range of motion deficits identified  -MEÑO     Comment, General Range of Motion affected hip mildly limited due to pain  -MEÑO       Row Name 11/08/24 0900          Strength Comprehensive (MMT)    General Manual Muscle Testing (MMT) Assessment lower extremity strength deficits identified  3-/5 RLE  -MEÑO       Row Name 11/08/24 0900          Mobility    Extremity Weight-bearing Status right lower extremity  -MEÑO     Right Lower Extremity (Weight-bearing Status) weight-bearing as tolerated (WBAT)  -MEÑO       Row Name 11/08/24 0900          Bed Mobility    Bed Mobility supine-sit;bed mobility (all) activities  -MEÑO     All Activities, [place] (Bed Mobility) minimum assist (75% patient effort)  -MEÑO     Supine-Sit [place] (Bed Mobility) minimum assist (75% patient effort)  -MEÑO       Row Name 11/08/24 0900          Transfers    Transfers bed-chair transfer;sit-stand transfer  -MEÑO       Row Name 11/08/24 0900          Bed-Chair Transfer    Bed-Chair [place] (Transfers) minimum assist (75% patient effort)  -MEÑO     Assistive Device (Bed-Chair Transfers) walker, front-wheeled  -MEÑO       Row Name 11/08/24 0900          Sit-Stand Transfer    Sit-Stand [place] (Transfers) minimum assist (75% patient effort)  -MEÑO     Assistive Device (Sit-Stand Transfers) walker, front-wheeled  -MEÑO       Row Name 11/08/24 0900          Gait/Stairs (Locomotion)    Gait/Stairs Locomotion gait/ambulation assistive device  -MEÑO     [place] Level (Gait) minimum assist (75% patient effort)  -MEÑO     Assistive Device (Gait) walker, front-wheeled  -MEÑO     Patient was able to Ambulate yes  -MEÑO     Distance in Feet (Gait) 5  limited by pain with WB  -MEÑO       Row Name 11/08/24 0900          Safety Issues/Impairments Affecting Functional Mobility    Impairments Affecting Function (Mobility) balance;pain;range of motion (ROM);strength  -MEÑO       Row Name 11/08/24 0900           Balance    Balance Assessment standing dynamic balance  -MEÑO     Dynamic Standing Balance minimal assist  -MEÑO     Position/Device Used, Standing Balance walker, front-wheeled  -MEÑO       Row Name             Wound 11/07/24 0345 Left anterior greater trochanter    Wound - Properties Group Placement Date: 11/07/24  -KW Placement Time: 0345  -KW Side: Left  -KW Orientation: anterior  -KW Location: greater trochanter  -KW Present on Original Admission: Y  -KW    Retired Wound - Properties Group Placement Date: 11/07/24  -KW Placement Time: 0345  -KW Present on Original Admission: Y  -KW Side: Left  -KW Orientation: anterior  -KW Location: greater trochanter  -KW    Retired Wound - Properties Group Placement Date: 11/07/24  -KW Placement Time: 0345  -KW Present on Original Admission: Y  -KW Side: Left  -KW Orientation: anterior  -KW Location: greater trochanter  -KW    Retired Wound - Properties Group Date first assessed: 11/07/24  -KW Time first assessed: 0345  -KW Present on Original Admission: Y  -KW Side: Left  -KW Location: greater trochanter  -KW      Row Name             Wound 11/07/24 0346 Right anterior greater trochanter    Wound - Properties Group Placement Date: 11/07/24  -KW Placement Time: 0346  -KW Side: Right  -KW Orientation: anterior  -KW Location: greater trochanter  -KW Present on Original Admission: Y  -KW    Retired Wound - Properties Group Placement Date: 11/07/24  -KW Placement Time: 0346  -KW Present on Original Admission: Y  -KW Side: Right  -KW Orientation: anterior  -KW Location: greater trochanter  -KW    Retired Wound - Properties Group Placement Date: 11/07/24  -KW Placement Time: 0346  -KW Present on Original Admission: Y  -KW Side: Right  -KW Orientation: anterior  -KW Location: greater trochanter  -KW    Retired Wound - Properties Group Date first assessed: 11/07/24  -KW Time first assessed: 0346  -KW Present on Original Admission: Y  -KW Side: Right  -KW Location: greater trochanter  -KW       Row Name             Wound 11/07/24 1107 Right anterior hip    Wound - Properties Group Placement Date: 11/07/24  -KJ Placement Time: 1107  -KJ Side: Right  -KJ Orientation: anterior  -KJ Location: hip  -KJ Primary Wound Type: Incision  -KJ, X3 INCISION SITES     Retired Wound - Properties Group Placement Date: 11/07/24  -KJ Placement Time: 1107  -KJ Side: Right  -KJ Orientation: anterior  -KJ Location: hip  -KJ Primary Wound Type: Incision  -KJ, X3 INCISION SITES     Retired Wound - Properties Group Placement Date: 11/07/24  -KJ Placement Time: 1107  -KJ Side: Right  -KJ Orientation: anterior  -KJ Location: hip  -KJ Primary Wound Type: Incision  -KJ, X3 INCISION SITES     Retired Wound - Properties Group Date first assessed: 11/07/24 -KJ Time first assessed: 1107  -KJ Side: Right  -KJ Location: hip  -KJ Primary Wound Type: Incision  -KJ, X3 INCISION SITES       Row Name 11/08/24 0900          Plan of Care Review    Plan of Care Reviewed With patient  -MEÑO     Outcome Evaluation Pt presents with decreased ROM, strength, transfers and ambulation.  Skilled PT services will be required to address these mobility deficits.  -MEÑO       Row Name 11/08/24 0900          Therapy Assessment/Plan (PT)    Rehab Potential (PT) good  -MEÑO     Criteria for Skilled Interventions Met (PT) skilled treatment is necessary  -MEÑO     Therapy Frequency (PT) daily  -MEÑO     Predicted Duration of Therapy Intervention (PT) 10 days  -MEÑO     Problem List (PT) problems related to;balance;mobility;strength;pain  -MEÑO     Activity Limitations Related to Problem List (PT) unable to ambulate safely;unable to transfer safely  -MEÑO       Row Name 11/08/24 0900          PT Evaluation Complexity    History, PT Evaluation Complexity no personal factors and/or comorbidities  -MEÑO     Examination of Body Systems (PT Eval Complexity) total of 4 or more elements  -MEÑO     Clinical Presentation (PT Evaluation Complexity) stable  -MEÑO     Clinical Decision Making  (PT Evaluation Complexity) low complexity  -MEÑO     Overall Complexity (PT Evaluation Complexity) low complexity  -MEÑO       Row Name 11/08/24 0900          Therapy Plan Review/Discharge Plan (PT)    Therapy Plan Review (PT) evaluation/treatment results reviewed;participants voiced agreement with care plan;participants included;patient  -MEÑO       Row Name 11/08/24 0900          Physical Therapy Goals    Transfer Goal Selection (PT) transfer, PT goal 1  -MEÑO     Gait Training Goal Selection (PT) gait training, PT goal 1  -MEÑO     Strength Goal Selection (PT) strength, PT goal 1  -MEÑO       Row Name 11/08/24 0900          Transfer Goal 1 (PT)    Activity/Assistive Device (Transfer Goal 1, PT) transfers, all  -MEÑO     [place] Level/Cues Needed (Transfer Goal 1, PT) independent  -MEÑO     Time Frame (Transfer Goal 1, PT) long term goal (LTG);10 days  -MEÑO       Row Name 11/08/24 0900          Gait Training Goal 1 (PT)    Activity/Assistive Device (Gait Training Goal 1, PT) gait (walking locomotion);walker, rolling  -MEÑO     [place] Level (Gait Training Goal 1, PT) independent  -MEÑO     Distance (Gait Training Goal 1, PT) 300  -MEÑO     Time Frame (Gait Training Goal 1, PT) long term goal (LTG);10 days  -MEÑO       Row Name 11/08/24 0900          Strength Goal 1 (PT)    Strength Goal 1 (PT) Pt will demonstrate 5/5 hip flexion strength on the affected side  -MEÑO     Time Frame (Strength Goal 1, PT) long term goal (LTG);10 days  -MEÑO               User Key  (r) = Recorded By, (t) = Taken By, (c) = Cosigned By      Initials Name Provider Type    Lakshmi Cavanaugh, RN Registered Nurse    Donnell Kelly, PT Physical Therapist    Valorie Fan RN Registered Nurse                    Physical Therapy Education        No education to display                  PT Recommendation and Plan  Anticipated Discharge Disposition (PT): skilled nursing facility  Planned Therapy Interventions (PT): balance training, bed mobility training,  gait training, home exercise program, ROM (range of motion), stair training, strengthening, transfer training  Therapy Frequency (PT): daily  Plan of Care Reviewed With: patient  Outcome Evaluation: Pt presents with decreased ROM, strength, transfers and ambulation.  Skilled PT services will be required to address these mobility deficits.   Outcome Measures       Row Name 11/08/24 0900             How much help from another person do you currently need...    Turning from your back to your side while in flat bed without using bedrails? 3  -MEÑO      Moving from lying on back to sitting on the side of a flat bed without bedrails? 3  -MEÑO      Moving to and from a bed to a chair (including a wheelchair)? 3  -MEÑO      Standing up from a chair using your arms (e.g., wheelchair, bedside chair)? 3  -MEÑO      Climbing 3-5 steps with a railing? 2  -MEÑO      To walk in hospital room? 2  -MEÑO      AM-PAC 6 Clicks Score (PT) 16  -MEÑO         Functional Assessment    Outcome Measure Options AM-PAC 6 Clicks Basic Mobility (PT)  -MEÑO                User Key  (r) = Recorded By, (t) = Taken By, (c) = Cosigned By      Initials Name Provider Type    Donnell Kelly, JUSTICE Physical Therapist                     Time Calculation:    PT Charges       Row Name 11/08/24 0926             Time Calculation    PT Received On 11/08/24  -MEÑO      PT Goal Re-Cert Due Date 11/17/24  -MEÑO         Untimed Charges    PT Eval/Re-eval Minutes 30  -MEÑO         Total Minutes    Untimed Charges Total Minutes 30  -MEÑO       Total Minutes 30  -MEÑO                User Key  (r) = Recorded By, (t) = Taken By, (c) = Cosigned By      Initials Name Provider Type    Donnell Kelly, PT Physical Therapist                      PT G-Codes  Outcome Measure Options: AM-PAC 6 Clicks Basic Mobility (PT)  AM-PAC 6 Clicks Score (PT): 16    Donnell Blandon, PT  11/8/2024

## 2024-11-08 NOTE — PROGRESS NOTES
[facility]   Cardiology Progress Note      Patient Name: Charbel Escoto  : 1934  MRN: [id number]  Primary Care Physician:  Dagmar Montez DO  Referring Physician: NGUYỄN Wade  Date of admission: 2024    Subjective   Subjective     Chief Complaint: Atrial fibrillation    HPI:  Charbel Escoto is a 89 y.o. male with right hip fracture status post surgery and atrial fibrillation.  Alert oriented no distress.        Objective    Objective     Vitals:   Vitals:    24 2327 24 0500 24 0606 24 0718   BP: 111/82 114/55  131/61   BP Location: Left arm Left arm  Right arm   Patient Position: Lying Lying  Lying   Pulse: 69 71  77   Resp: 18 18 15 16   Temp: 98.1 °F (36.7 °C) 98.1 °F (36.7 °C)  98.1 °F (36.7 °C)   TempSrc: Oral Oral  Oral   SpO2: 94% 97% 95% 97%            Physical Exam:   Constitutional: Awake, alert, No acute distress    Eyes: PERRLA, sclerae anicteric, no conjunctival injection   HENT: NCAT, mucous membranes moist   Neck: Supple, no thyromegaly, no lymphadenopathy, trachea midline   Respiratory: Clear to auscultation bilaterally, nonlabored respirations    Cardiovascular: Irregular heart sound, no murmurs, rubs, or gallops, palpable pedal pulses bilaterally       Current medications:  escitalopram, 10 mg, Oral, Daily  heparin (porcine), 5,000 Units, Subcutaneous, Q12H  senna-docusate sodium, 2 tablet, Oral, BID  sodium chloride, 10 mL, Intravenous, Q12H  sodium chloride, 10 mL, Intravenous, Q12H  tamsulosin, 0.4 mg, Oral, Nightly      Current IV drips:  lactated ringers, 20 mL/hr, Last Rate: 50 mL/hr (24 1038)        Result Review    Result Review:  I have personally reviewed the results from the time of this admission to 2024 08:23 EST and agree with these findings:  []  Laboratory  []  EKG/Telemetry   []  Cardiology/Vascular   []  Radiology         CBC          2024    02:58 2024    04:11   CBC   WBC 11.36     RBC 4.14    "  Hemoglobin 11.6  10.8    Hematocrit 36.1  34.3    MCV 87.2     MCH 28.0     MCHC 32.1     RDW 13.5     Platelets 206       CMP          11/7/2024    02:58 11/8/2024    04:11   CMP   Glucose 116  120    BUN 35  43    Creatinine 1.34  1.56    EGFR 50.3  41.9    Sodium 135  133    Potassium 4.1  4.5    Chloride 102  101    Calcium 8.8  8.5    Total Protein 6.1     Albumin 3.0     Globulin 3.1     Total Bilirubin 0.8     Alkaline Phosphatase 57     AST (SGOT) 22     ALT (SGPT) 11     Albumin/Globulin Ratio 1.0     BUN/Creatinine Ratio 26.1  27.6    Anion Gap 11.7  8.0      Results for orders placed during the hospital encounter of 11/07/24    Adult Transthoracic Echo Complete W/ Cont if Necessary Per Protocol    Interpretation Summary  Normal left ventricular systolic function.  Mild aortic stenosis.        No results found for: \"PROBNP\"      Telemetry reviewed sinus rhythm with PACs    Assessment / Plan     ASSESSMENT:    Femoral neck fracture    Essential hypertension    BPH (benign prostatic hyperplasia)  Paroxysmal atrial fibrillation.  Controlled      PLAN:  1.  Eliquis 2.5 mg twice a day when okay with surgery.  2.  Stable cardiac.  3.  Will sign off and see as needed.    Electronically signed by Ervin Kim MD, 11/08/24, 8:23 AM EST.             "

## 2024-11-08 NOTE — PLAN OF CARE
Goal Outcome Evaluation:  Plan of Care Reviewed With: patient        Progress: improving  Outcome Evaluation: Pt. very hard of hearing this shift. Repostioned PRN. Dressings to right hip remains CDI. ICE PRN to right hip. Pt. states pain is 1 out of 10 throughout shift. This RN contacted hospitalist PA to get PRN PO pain medication ordered in preparation for pain. Heparin held this shift, PA aware, due to timing post op. Pt. down to room air this morning.

## 2024-11-08 NOTE — THERAPY EVALUATION
Patient Name: Charbel Escoto  : 1934    MRN: [id number]                              Today's Date: 2024       Admit Date: 2024    Visit Dx:     ICD-10-CM ICD-9-CM   1. Difficulty in walking  R26.2 719.7   2. Closed fracture of neck of right femur, initial encounter  S72.001A 820.8   3. Decreased activities of daily living (ADL)  Z78.9 V49.89     Patient Active Problem List   Diagnosis    Femoral neck fracture    Essential hypertension    BPH (benign prostatic hyperplasia)     Past Medical History:   Diagnosis Date    Arthritis     Hypertension      Past Surgical History:   Procedure Laterality Date    HIP INTERTROCHANTERIC NAILING Right 2024    Procedure: HIP INTERTROCHANTERIC NAILING;  Surgeon: Jesus Turner MD;  Location: [facility] OR;  Service: Orthopedics;  Laterality: Right;      General Information       Row Name 24 1340          OT Time and Intention    Subjective Information no complaints  -ES     Document Type evaluation  -ES     Mode of Treatment individual therapy;occupational therapy  -ES     Patient Effort good  -ES       Row Name 24 1341          General Information    Patient Profile Reviewed yes  -ES     Prior Level of Function independent:;ADL's;all household mobility  Patient reports he is independent with ADLs at baseline, family assist with IADLs. No device for functional mobility. Walk in shower, standing shower completion. Standard height commode. Reports frequent falls. No home O2 use.  -ES     Existing Precautions/Restrictions fall;weight bearing  WBAT RLE  -ES     Barriers to Rehab none identified  -ES       Row Name 24 1023          Occupational Profile    Reason for Services/Referral (Occupational Profile) Patient is 89 yr old male admitted to [facility] on 20[facility] after mechanical fall at home resulting in R femoral head fracture. Patient is POD 1 right intertrochanteric hip nailing, WBAT RLE. OT evaluation and treatment ordered  d/t recent decline in ADLs/transfer ability and discharge planning recommendations. No previous OT services for current condition.  -ES       Row Name 11/08/24 1340          Living Environment    People in Home grandchild(meilta)  -ES       Row Name 11/08/24 1340          Home Main Entrance    Number of Stairs, Main Entrance two  -ES       Row Name 11/08/24 1340          Stairs Within Home, Primary    Number of Stairs, Within Home, Primary none  -ES       Row Name 11/08/24 1340          Cognition    Orientation Status (Cognition) oriented x 3  patient pleasant and cooperative, agreeable to therapy participation.  -ES       Row Name 11/08/24 1340          Safety Issues/Impairments Affecting Functional Mobility    Impairments Affecting Function (Mobility) balance;pain;range of motion (ROM);strength  -ES               User Key  (r) = Recorded By, (t) = Taken By, (c) = Cosigned By      Initials Name Provider Type    Sarah Doyle, OTR/L, CSRS Occupational Therapist                     Mobility/ADL's       Row Name 11/08/24 1343          Bed Mobility    Bed Mobility bed mobility (all) activities  -ES     All Activities, [place] (Bed Mobility) not tested  -ES     Comment, (Bed Mobility) not tested. Patient met seated in recliner at therapy arrival to room.  -ES       Row Name 11/08/24 1343          Transfers    Transfers sit-stand transfer;stand-sit transfer  -ES       Row Name 11/08/24 1343          Bed-Chair Transfer    Bed-Chair [place] (Transfers) moderate assist (50% patient effort);1 person assist  -ES     Assistive Device (Bed-Chair Transfers) walker, front-wheeled  -ES       Row Name 11/08/24 1343          Sit-Stand Transfer    Sit-Stand [place] (Transfers) moderate assist (50% patient effort);1 person assist  -ES     Assistive Device (Sit-Stand Transfers) walker, front-wheeled  -ES       Row Name 11/08/24 1343          Activities of Daily Living    BADL Assessment/Intervention bathing;upper body  dressing;lower body dressing;grooming;feeding;toileting  -ES       Row Name 11/08/24 1343          Mobility    Extremity Weight-bearing Status right lower extremity  -ES     Right Lower Extremity (Weight-bearing Status) weight-bearing as tolerated (WBAT)  -ES       Row Name 11/08/24 1343          Bathing Assessment/Intervention    [place] Level (Bathing) bathing skills;moderate assist (50% patient effort)  -ES       Row Name 11/08/24 1343          Upper Body Dressing Assessment/Training    [place] Level (Upper Body Dressing) upper body dressing skills;set up;standby assist  -ES       Row Name 11/08/24 1343          Lower Body Dressing Assessment/Training    [place] Level (Lower Body Dressing) lower body dressing skills;maximum assist (25% patient effort)  -ES       Row Name 11/08/24 1343          Grooming Assessment/Training    [place] Level (Grooming) grooming skills;standby assist;set up  -ES       Row Name 11/08/24 1343          Self-Feeding Assessment/Training    [place] Level (Feeding) feeding skills;set up  -ES       Row Name 11/08/24 1343          Toileting Assessment/Training    [place] Level (Toileting) toileting skills;maximum assist (25% patient effort)  -ES               User Key  (r) = Recorded By, (t) = Taken By, (c) = Cosigned By      Initials Name Provider Type    Sarah Doyle, OTR/L, CSRS Occupational Therapist                   Obj/Interventions       Row Name 11/08/24 1344          Vision Assessment/Intervention    Visual Impairment/Limitations WFL  -ES       Row Name 11/08/24 1344          Range of Motion Comprehensive    General Range of Motion bilateral upper extremity ROM WNL  -ES       Row Name 11/08/24 1344          Strength Comprehensive (MMT)    General Manual Muscle Testing (MMT) Assessment lower extremity strength deficits identified  -ES     Comment, General Manual Muscle Testing (MMT) Assessment BUEs 4/5  -ES       Row Name 11/08/24 1344          Motor  Skills    Motor Skills functional endurance  -ES     Functional Endurance fair  -ES       Row Name 11/08/24 1344          Balance    Balance Assessment standing static balance  -ES     Static Standing Balance 1-person assist;moderate assist  -ES     Position/Device Used, Standing Balance supported;walker, front-wheeled  -ES               User Key  (r) = Recorded By, (t) = Taken By, (c) = Cosigned By      Initials Name Provider Type    ES [place], Sarah, OTR/L, CSRS Occupational Therapist                   Goals/Plan       Row Name 11/08/24 1345          Bed Mobility Goal 1 (OT)    Activity/Assistive Device (Bed Mobility Goal 1, OT) bed mobility activities, all  -ES     [place] Level/Cues Needed (Bed Mobility Goal 1, OT) independent  -ES     Time Frame (Bed Mobility Goal 1, OT) long term goal (LTG);10 days  -ES       Row Name 11/08/24 1345          Transfer Goal 1 (OT)    Activity/Assistive Device (Transfer Goal 1, OT) transfers, all;walker, rolling  -ES     [place] Level/Cues Needed (Transfer Goal 1, OT) modified independence  -ES     Time Frame (Transfer Goal 1, OT) long term goal (LTG);10 days  -ES       Row Name 11/08/24 1345          Bathing Goal 1 (OT)    Activity/Device (Bathing Goal 1, OT) bathing skills, all  -ES     [place] Level/Cues Needed (Bathing Goal 1, OT) independent  -ES     Time Frame (Bathing Goal 1, OT) long term goal (LTG);10 days  -ES       Row Name 11/08/24 1345          Dressing Goal 1 (OT)    Activity/Device (Dressing Goal 1, OT) dressing skills, all  -ES     [place]/Cues Needed (Dressing Goal 1, OT) independent  -ES     Time Frame (Dressing Goal 1, OT) long term goal (LTG);10 days  -ES       Row Name 11/08/24 1345          Toileting Goal 1 (OT)    Activity/Device (Toileting Goal 1, OT) toileting skills, all  -ES     [place] Level/Cues Needed (Toileting Goal 1, OT) independent  -ES     Time Frame (Toileting Goal 1, OT) long term goal (LTG);10 days  -ES       Row Name  11/08/24 1345          Grooming Goal 1 (OT)    Activity/Device (Grooming Goal 1, OT) grooming skills, all  -ES     [place] (Grooming Goal 1, OT) independent  -ES     Time Frame (Grooming Goal 1, OT) long term goal (LTG);10 days  -ES       Row Name 11/08/24 1345          Problem Specific Goal 1 (OT)    Problem Specific Goal 1 (OT) Patient will demonstrate good graded dynamic standing balance in preperation for independent ADL routine completion at time of discharge  -ES     Time Frame (Problem Specific Goal 1, OT) long term goal (LTG);10 days  -ES       Row Name 11/08/24 1344          Therapy Assessment/Plan (OT)    Planned Therapy Interventions (OT) activity tolerance training;BADL retraining;functional balance retraining;occupation/activity based interventions;patient/caregiver education/training;strengthening exercise;transfer/mobility retraining  -ES               User Key  (r) = Recorded By, (t) = Taken By, (c) = Cosigned By      Initials Name Provider Type    ES Sarah Yeboah, OTR/L, CSRS Occupational Therapist                   Clinical Impression       Row Name 11/08/24 1349          Pain Assessment    Pretreatment Pain Rating 0/10 - no pain  -ES     Posttreatment Pain Rating 0/10 - no pain  -ES       Row Name 11/08/24 1348          Plan of Care Review    Plan of Care Reviewed With patient  -ES     Outcome Evaluation Patient has experienced decline in function from baseline status, presenting with deficits related to balance, strength, safety awareness, transfers and mobility that impede patient independence with activities of daily living.  Patient would benefit from skilled Occupational Therapy intervention to maximize patient safety, and promote return to baseline independence.  -ES       Row Name 11/08/24 1346          Therapy Assessment/Plan (OT)    Rehab Potential (OT) good  -ES     Criteria for Skilled Therapeutic Interventions Met (OT) yes;meets criteria;skilled treatment is necessary  -ES      Therapy Frequency (OT) 5 times/wk  -ES       Row Name 11/08/24 1344          Therapy Plan Review/Discharge Plan (OT)    Anticipated Discharge Disposition (OT) skilled nursing facility  -ES       Row Name 11/08/24 1344          Positioning and Restraints    Pre-Treatment Position sitting in chair/recliner  -ES     Post Treatment Position chair  -ES     In Chair reclined;call light within reach;encouraged to call for assist;exit alarm on  -ES               User Key  (r) = Recorded By, (t) = Taken By, (c) = Cosigned By      Initials Name Provider Type    Sarah Doyle, OTR/L, CSRS Occupational Therapist                   Outcome Measures       Row Name 11/08/24 1346          How much help from another is currently needed...    Putting on and taking off regular lower body clothing? 2  -ES     Bathing (including washing, rinsing, and drying) 2  -ES     Toileting (which includes using toilet bed pan or urinal) 2  -ES     Putting on and taking off regular upper body clothing 3  -ES     Taking care of personal grooming (such as brushing teeth) 3  -ES     Eating meals 4  -ES     AM-PAC 6 Clicks Score (OT) 16  -ES       Row Name 11/08/24 0900 11/08/24 0718       How much help from another person do you currently need...    Turning from your back to your side while in flat bed without using bedrails? 3  -MEÑO 2  -KT    Moving from lying on back to sitting on the side of a flat bed without bedrails? 3  -MEÑO 2  -KT    Moving to and from a bed to a chair (including a wheelchair)? 3  -MEÑO 2  -KT    Standing up from a chair using your arms (e.g., wheelchair, bedside chair)? 3  -MEÑO 1  -KT    Climbing 3-5 steps with a railing? 2  -MEÑO 1  -KT    To walk in hospital room? 2  -MEÑO 1  -KT    AM-PAC 6 Clicks Score (PT) 16  -MEÑO 9  -KT    Highest Level of Mobility Goal 5 --> Static standing  -MEÑO 3 --> Sit at edge of bed  -KT      Row Name 11/08/24 1346 11/08/24 0900       Functional Assessment    Outcome Measure Options AM-PAC 6 Clicks Daily  Activity (OT)  -ES AM-PAC 6 Clicks Basic Mobility (PT)  -MEÑO              User Key  (r) = Recorded By, (t) = Taken By, (c) = Cosigned By      Initials Name Provider Type    Donnell Kelly, PT Physical Therapist    Sarah Doyle, OTR/L, CSRS Occupational Therapist    Rahat Arreaga, RN Registered Nurse                    Occupational Therapy Education        No education to display                  OT Recommendation and Plan  Planned Therapy Interventions (OT): activity tolerance training, BADL retraining, functional balance retraining, occupation/activity based interventions, patient/caregiver education/training, strengthening exercise, transfer/mobility retraining  Therapy Frequency (OT): 5 times/wk  Plan of Care Review  Plan of Care Reviewed With: patient  Outcome Evaluation: Patient has experienced decline in function from baseline status, presenting with deficits related to balance, strength, safety awareness, transfers and mobility that impede patient independence with activities of daily living.  Patient would benefit from skilled Occupational Therapy intervention to maximize patient safety, and promote return to baseline independence.     Time Calculation:   Evaluation Complexity (OT)  Review Occupational Profile/Medical/Therapy History Complexity: brief/low complexity  Assessment, Occupational Performance/Identification of Deficit Complexity: 3-5 performance deficits  Clinical Decision Making Complexity (OT): problem focused assessment/low complexity  Overall Complexity of Evaluation (OT): low complexity     Time Calculation- OT       Row Name 11/08/24 1346             Time Calculation- OT    OT Received On 11/08/24  -ES      OT Goal Re-Cert Due Date 11/17/24  -ES         Untimed Charges    OT Eval/Re-eval Minutes 32  -ES         Total Minutes    Untimed Charges Total Minutes 32  -ES       Total Minutes 32  -ES                User Key  (r) = Recorded By, (t) = Taken By, (c) = Cosigned By       Initials Name Provider Type    ES Sarah Yeboah OTR/L, CSRS Occupational Therapist                  Therapy Charges for Today       Code Description Service Date Service Provider Modifiers Qty    32748034282 HC OT EVAL LOW COMPLEXITY 3 11/8/2024 Sarah Yeboah OTR/L, CSRS GO 1                 EMILIANO Bassett/L, CSRS  11/8/2024

## 2024-11-09 LAB
ALBUMIN SERPL-MCNC: 2.8 G/DL (ref 3.5–5.2)
ANION GAP SERPL CALCULATED.3IONS-SCNC: 11.2 MMOL/L (ref 5–15)
BUN SERPL-MCNC: 47 MG/DL (ref 8–23)
BUN/CREAT SERPL: 32.9 (ref 7–25)
CALCIUM SPEC-SCNC: 8.4 MG/DL (ref 8.2–9.6)
CHLORIDE SERPL-SCNC: 99 MMOL/L (ref 98–107)
CO2 SERPL-SCNC: 21.8 MMOL/L (ref 22–29)
CREAT SERPL-MCNC: 1.43 MG/DL (ref 0.76–1.27)
DEPRECATED RDW RBC AUTO: 42.4 FL (ref 37–54)
EGFRCR SERPLBLD CKD-EPI 2021: 46.5 ML/MIN/1.73
ERYTHROCYTE [DISTWIDTH] IN BLOOD BY AUTOMATED COUNT: 13.2 % (ref 12.3–15.4)
FERRITIN SERPL-MCNC: 232.4 NG/ML (ref 30–400)
GLUCOSE SERPL-MCNC: 128 MG/DL (ref 65–99)
HCT VFR BLD AUTO: 31.5 % (ref 37.5–51)
HGB BLD-MCNC: 10 G/DL (ref 13–17.7)
IRON 24H UR-MRATE: 20 MCG/DL (ref 59–158)
IRON SATN MFR SERPL: 10 % (ref 20–50)
MCH RBC QN AUTO: 27.8 PG (ref 26.6–33)
MCHC RBC AUTO-ENTMCNC: 31.7 G/DL (ref 31.5–35.7)
MCV RBC AUTO: 87.5 FL (ref 79–97)
PHOSPHATE SERPL-MCNC: 3.5 MG/DL (ref 2.5–4.5)
PLATELET # BLD AUTO: 224 10*3/MM3 (ref 140–450)
PMV BLD AUTO: 9.8 FL (ref 6–12)
POTASSIUM SERPL-SCNC: 4.1 MMOL/L (ref 3.5–5.2)
RBC # BLD AUTO: 3.6 10*6/MM3 (ref 4.14–5.8)
SODIUM SERPL-SCNC: 132 MMOL/L (ref 136–145)
TIBC SERPL-MCNC: 198 MCG/DL (ref 298–536)
TRANSFERRIN SERPL-MCNC: 133 MG/DL (ref 200–360)
WBC NRBC COR # BLD AUTO: 12.28 10*3/MM3 (ref 3.4–10.8)

## 2024-11-09 PROCEDURE — 83540 ASSAY OF IRON: CPT | Performed by: FAMILY MEDICINE

## 2024-11-09 PROCEDURE — 97110 THERAPEUTIC EXERCISES: CPT

## 2024-11-09 PROCEDURE — 99232 SBSQ HOSP IP/OBS MODERATE 35: CPT | Performed by: FAMILY MEDICINE

## 2024-11-09 PROCEDURE — 97116 GAIT TRAINING THERAPY: CPT

## 2024-11-09 PROCEDURE — 85027 COMPLETE CBC AUTOMATED: CPT | Performed by: FAMILY MEDICINE

## 2024-11-09 PROCEDURE — 80069 RENAL FUNCTION PANEL: CPT | Performed by: FAMILY MEDICINE

## 2024-11-09 PROCEDURE — 82728 ASSAY OF FERRITIN: CPT | Performed by: FAMILY MEDICINE

## 2024-11-09 PROCEDURE — 84466 ASSAY OF TRANSFERRIN: CPT | Performed by: FAMILY MEDICINE

## 2024-11-09 RX ORDER — FERROUS SULFATE 325(65) MG
325 TABLET ORAL
Status: DISCONTINUED | OUTPATIENT
Start: 2024-11-09 | End: 2024-11-12 | Stop reason: HOSPADM

## 2024-11-09 RX ADMIN — ESCITALOPRAM OXALATE 10 MG: 10 TABLET ORAL at 09:54

## 2024-11-09 RX ADMIN — APIXABAN 2.5 MG: 2.5 TABLET, FILM COATED ORAL at 09:54

## 2024-11-09 RX ADMIN — Medication 10 ML: at 21:40

## 2024-11-09 RX ADMIN — TAMSULOSIN HYDROCHLORIDE 0.4 MG: 0.4 CAPSULE ORAL at 21:41

## 2024-11-09 RX ADMIN — NICOTINE 1 PATCH: 21 PATCH, EXTENDED RELEASE TRANSDERMAL at 09:56

## 2024-11-09 RX ADMIN — APIXABAN 2.5 MG: 2.5 TABLET, FILM COATED ORAL at 21:41

## 2024-11-09 RX ADMIN — SENNOSIDES AND DOCUSATE SODIUM 2 TABLET: 50; 8.6 TABLET ORAL at 21:40

## 2024-11-09 RX ADMIN — Medication 10 ML: at 09:57

## 2024-11-09 RX ADMIN — FERROUS SULFATE TAB 325 MG (65 MG ELEMENTAL FE) 325 MG: 325 (65 FE) TAB at 09:54

## 2024-11-09 RX ADMIN — Medication 10 ML: at 09:56

## 2024-11-09 RX ADMIN — SENNOSIDES AND DOCUSATE SODIUM 2 TABLET: 50; 8.6 TABLET ORAL at 09:54

## 2024-11-09 NOTE — PROGRESS NOTES
[facility]   Hospitalist Progress Note  Date: 2024  Patient Name: Charbel Escoto  : 1934  MRN: [id number]  Date of admission: 2024      Subjective   Subjective     Chief Complaint: Follow-up right hip pain    Summary:Charbel Escoto is a 89 y.o. male with with past medical history of hypertension, BPH, arthritis, hearing impairment and GERD was transferred to this facility from [facility] for management of a right hip fracture.  Per son at bedside, last week the patient was chasing a chicken in his backyard and he accidentally fell to the ground hitting his right knee and his head.  Patient had a contusion to the top of his head which resolved after a few days but then he began to have severe right lower extremity pain that limited his mobility and ambulation.  Patient went to PCPs office to have x-rays done which he read as negative but the pain persisted so he was taken to outside facility was found to have a right hip fracture.  Due to lack of orthopedic surgery patient was transferred here for higher level of care.  At our facility patient's vitals were all within normal on arrival.  Labs showed reduced renal function with a mild leukocytosis and elevated INR 1.17.  CT of the right lower extremity showed an acute comminuted displaced right femoral neck fracture.  Patient admitted for further evaluation and treatment.  Orthopedics consulted.  Cardiology consulted for preop clearance.  Patient underwent right hip intertrochanteric nailing.  Tolerated procedure well.  Seen by physical therapy occupational therapy recommending skilled nursing rehab.  Pre-CERT pending at [place].  Family requesting referral be sent to [facility].  Discharge planning coordinating.    Interval Followup: Patient sitting up in bedside chair appears to be resting comfortably with family at the bedside.  Patient less anxious today.  Family indicates that Xanax has helped quite a lot.  Family  interested in changing rehab disposition to [facility].  Afebrile overnight.  Sinus rhythm PVCs 60s to 80s on telemetry review.  Blood pressure within normal limits.  Satting well on room air.  Serum creatinine slightly improved.  Hemoglobin trending down slightly.  Serum iron low.  No other issues per nursing.      Review of Systems  Constitutional: Negative for fatigue and fever.   HENT: Negative for sore throat and trouble swallowing.    Eyes: Negative for pain and discharge.   Respiratory: Negative for cough and shortness of breath.    Cardiovascular: Negative for chest pain and palpitations.   Gastrointestinal: Negative for abdominal pain, nausea and vomiting.   Endocrine: Negative for cold intolerance and heat intolerance.   Genitourinary: Negative for difficulty urinating and dysuria.   Musculoskeletal: Negative for back pain and neck stiffness.  Right hip pain  Skin: Negative for color change and rash.   Neurological: Negative for syncope and headaches.   Hematological: Negative for adenopathy.   Psychiatric/Behavioral: Negative for confusion and hallucinations.    Objective   Objective     Vitals:   Temp:  [97.2 °F (36.2 °C)-98.6 °F (37 °C)] 98.2 °F (36.8 °C)  Heart Rate:  [69-85] 81  Resp:  [16-18] 18  BP: (122-155)/(56-91) 147/65  Physical Exam   General: well-developed appearing stated age in no acute distress  HEENT: Normocephalic atraumatic moist membranes pupils equal round reactive light, no scleral icterus no conjunctival injection  Cardiovascular: regular rate and rhythm no murmurs rubs or gallops S1-S2, no lower extremity edema appreciated  Pulmonary: Clear to auscultation bilaterally no wheezes rales or rhonchi symmetric chest expansion, unlabored, no conversational dyspnea appreciated  Gastrointestinal: Soft nontender nondistended positive bowel sounds all 4 quadrants no rebound or guarding  Musculoskeletal: No clubbing cyanosis, warm and well-perfused, calves soft symmetric  nontender bilaterally  Skin: Clean dry without rashes  Neuro: Cranial nerves II through XII intact grossly no sensorimotor deficits appreciated bilateral upper and lower extremities  Psych: Patient is calm cooperative and appropriate with exam not responding to internal stimuli  : No Zavala catheter no bladder distention no suprapubic tenderness    Result Review    Result Review:  I have personally reviewed these results and agree with these findings:  [x]  Laboratory  LAB RESULTS:      Lab 11/09/24  0401 11/08/24 0411 11/07/24 0258   WBC 12.28*  --  11.36*   HEMOGLOBIN 10.0* 10.8* 11.6*   HEMATOCRIT 31.5* 34.3* 36.1*   PLATELETS 224  --  206   NEUTROS ABS  --   --  8.16*   IMMATURE GRANS (ABS)  --   --  0.48*   LYMPHS ABS  --   --  0.97   MONOS ABS  --   --  1.53*   EOS ABS  --   --  0.14   MCV 87.5  --  87.2   PROTIME  --   --  15.1*         Lab 11/09/24 0401 11/08/24 0411 11/07/24 0258   SODIUM 132* 133* 135*   POTASSIUM 4.1 4.5 4.1   CHLORIDE 99 101 102   CO2 21.8* 24.0 21.3*   ANION GAP 11.2 8.0 11.7   BUN 47* 43* 35*   CREATININE 1.43* 1.56* 1.34*   EGFR 46.5* 41.9* 50.3*   GLUCOSE 128* 120* 116*   CALCIUM 8.4 8.5 8.8   PHOSPHORUS 3.5  --   --          Lab 11/09/24 0401 11/07/24 0258   TOTAL PROTEIN  --  6.1   ALBUMIN 2.8* 3.0*   GLOBULIN  --  3.1   ALT (SGPT)  --  11   AST (SGOT)  --  22   BILIRUBIN  --  0.8   ALK PHOS  --  57         Lab 11/07/24 0258   PROTIME 15.1*   INR 1.17*             Lab 11/09/24 0401   IRON 20*   IRON SATURATION (TSAT) 10*   TIBC 198*   TRANSFERRIN 133*   FERRITIN 232.40         Brief Urine Lab Results       None          Microbiology Results (last 10 days)       ** No results found for the last 240 hours. **            []  Microbiology  [x]  Radiology  XR Chest 1 View    Result Date: 11/7/2024  No acute infiltrate is appreciated. No cardiac enlargement. Please see above comments for further detail.    Portions of this note were completed with a voice recognition program.   Electronically Signed By-Aman Mojica MD On:11/7/2024 6:35 AM      CT Lower Extremity Right Without Contrast    Result Date: 11/7/2024  There is an acute comminuted displaced right femoral neck fracture, as discussed. It is impacted. There is a fracture line extending into the subtrochanteric region, as detailed above. No dislocation is seen. Please see above comments for further detail.    Portions of this note were completed with a voice recognition program.  Electronically Signed By-Aman Mojica MD On:11/7/2024 4:43 AM       [x]  EKG/Telemetry   [x]  Cardiology/Vascular   Transthoracic echocardiogram 11/7/2024  Normal left ventricular systolic function.  Mild aortic stenosis.  []  Pathology  []  Old records  [x]  Other:  Scheduled Meds:apixaban, 2.5 mg, Oral, Q12H  escitalopram, 10 mg, Oral, Daily  ferrous sulfate, 325 mg, Oral, Daily With Breakfast  nicotine, 1 patch, Transdermal, Q24H  senna-docusate sodium, 2 tablet, Oral, BID  sodium chloride, 10 mL, Intravenous, Q12H  sodium chloride, 10 mL, Intravenous, Q12H  tamsulosin, 0.4 mg, Oral, Nightly      Continuous Infusions:   PRN Meds:.  ALPRAZolam    senna-docusate sodium **AND** polyethylene glycol **AND** bisacodyl **AND** bisacodyl    HYDROcodone-acetaminophen    metoprolol tartrate    Morphine **AND** naloxone    nicotine polacrilex    ondansetron ODT **OR** ondansetron    senna-docusate sodium **AND** [DISCONTINUED] polyethylene glycol **AND** [DISCONTINUED] bisacodyl **AND** [DISCONTINUED] bisacodyl    sodium chloride    sodium chloride    sodium chloride    sodium chloride      Assessment & Plan   Assessment / Plan     Assessment/Plan:  Acute comminuted displaced right femoral neck fracture  Paroxysmal A-fib  BPH  Renal insufficiency unknown chronicity  Normocytic anemia with iron deficiency      Patient admitted for further evaluation and treatment  Orthopedics consulted thank you for your recommendations  Status post right hip intertrochanteric  nailing  Continue pain control with oral analgesics IV for breakthrough  Continue to encourage early activity  Continue to encourage incentive spirometry  Continue physical therapy Occupational Therapy  Continue dressing changes per orthopedics  Continue Eliquis 2.5 mg twice daily  Continue tamsulosin  Repeat renal panel in a.m.  Continue avoid nephrotoxics  Repeat CBC in a.m.  Start oral iron replacement  Further inpatient orders recommendations pending clinical course        Discussed plan with bedside RN as well as social work.    Disposition: Pre-CERT pending at [facility].  Family requesting to discharge to [facility].    VTE Prophylaxis:  Pharmacologic VTE prophylaxis orders are present.        CODE STATUS:   Level Of Support Discussed With: Patient  Code Status (Patient has no pulse and is not breathing): CPR (Attempt to Resuscitate)  Medical Interventions (Patient has pulse or is breathing): Full Support

## 2024-11-09 NOTE — PLAN OF CARE
Goal Outcome Evaluation:  Plan of Care Reviewed With: patient, family        Progress: improving  Outcome Evaluation: Pt. VSS, very hard of hearing. Family at bedside this shift. Pt. ambulating with one assist, gait belt, and walker. No complaints of pain noted. ICE PRN to right hip. Neurovascular checks remain intact.

## 2024-11-09 NOTE — PLAN OF CARE
Goal Outcome Evaluation:  Plan of Care Reviewed With: patient           Outcome Evaluation: Pt is A&O X 4, on room air, and X 1 assist with gait belt and walker. All scheduled medications given as ordered. Pt had no complaints of pain during shift, VSS. Son at bedside throughout shift. Safety checks maintained throughout shift with pt resting in chair, wheels to chair locked, and personal items and call light within reach.

## 2024-11-09 NOTE — THERAPY TREATMENT NOTE
Acute Care - Physical Therapy Treatment Note   [place]     Patient Name: Charbel Escoto  : 1934  MRN: [id number]  Today's Date: 2024      Visit Dx:     ICD-10-CM ICD-9-CM   1. Difficulty in walking  R26.2 719.7   2. Closed fracture of neck of right femur, initial encounter  S72.001A 820.8   3. Decreased activities of daily living (ADL)  Z78.9 V49.89     Patient Active Problem List   Diagnosis    Femoral neck fracture    Essential hypertension    BPH (benign prostatic hyperplasia)     Past Medical History:   Diagnosis Date    Arthritis     Hypertension      Past Surgical History:   Procedure Laterality Date    HIP INTERTROCHANTERIC NAILING Right 2024    Procedure: HIP INTERTROCHANTERIC NAILING;  Surgeon: Jesus Turner MD;  Location: [facility];  Service: Orthopedics;  Laterality: Right;     PT Assessment (Last 12 Hours)       PT Evaluation and Treatment       Row Name 24 1336          Physical Therapy Time and Intention    Subjective Information complains of;pain  -VK     Document Type therapy note (daily note)  -VK     Mode of Treatment individual therapy;physical therapy  -VK     Patient Effort good  -VK       Row Name 24 1336          General Information    Patient Profile Reviewed yes  -VK     Existing Precautions/Restrictions fall;weight bearing  -VK     Limitations/Impairments hearing  [place]  -VK       Row Name 24 1336          Pain    Pain Location knee  -VK     Pain Side/Orientation right  -VK     Pain Management Interventions exercise or physical activity utilized;positioning techniques utilized  -VK     Pre/Posttreatment Pain Comment Pt reports his R knee has given him trouble for a while. Family member reports that pt's R knee gives out a lot.  -VK       Row Name 24 1336          Cognition    Affect/Mental Status (Cognition) WFL  -VK     Orientation Status (Cognition) oriented x 3  -VK     Personal Safety Interventions nonskid shoes/slippers when out of  bed;gait belt;fall prevention program maintained  -VK       Row Name 11/09/24 1336          Transfers    Transfers sit-stand transfer;stand-sit transfer  -VK       Row Name 11/09/24 1336          Sit-Stand Transfer    Sit-Stand [place] (Transfers) minimum assist (75% patient effort);verbal cues  -VK     Assistive Device (Sit-Stand Transfers) walker, front-wheeled  -VK       Row Name 11/09/24 1336          Stand-Sit Transfer    Stand-Sit [place] (Transfers) minimum assist (75% patient effort);verbal cues  -VK     Assistive Device (Stand-Sit Transfers) walker, front-wheeled  -VK       Row Name 11/09/24 1336          Gait/Stairs (Locomotion)    [place] Level (Gait) minimum assist (75% patient effort);verbal cues  -VK     Assistive Device (Gait) walker, front-wheeled  -VK     Patient was able to Ambulate yes  -VK     Distance in Feet (Gait) 30  -VK     Pattern (Gait) step-to  -VK     Deviations/Abnormal Patterns (Gait) antalgic;yannick decreased;festinating/shuffling;gait speed decreased;stride length decreased  -VK     Bilateral Gait Deviations forward flexed posture;heel strike decreased  -VK     Right Sided Gait Deviations weight shift ability decreased  -VK       Row Name 11/09/24 1336          Safety Issues/Impairments Affecting Functional Mobility    Impairments Affecting Function (Mobility) balance;pain;range of motion (ROM);strength  -VK       Row Name 11/09/24 1336          Balance    Balance Assessment sit to stand dynamic balance;standing static balance;standing dynamic balance  -VK     Sit to Stand Dynamic Balance minimal assist  -VK     Static Standing Balance contact guard  -VK     Dynamic Standing Balance minimal assist;verbal cues  -VK     Position/Device Used, Standing Balance walker, front-wheeled  -VK     Balance Interventions sit to stand  -VK       Row Name 11/09/24 1336          Motor Skills    Therapeutic Exercise hip;knee;ankle  -VK       Row Name 11/09/24 1336          Hip  (Therapeutic Exercise)    Hip (Therapeutic Exercise) isometric exercises  -     Hip Isometrics (Therapeutic Exercise) aDduction;10 repetitions;2 sets  -       Row Name 11/09/24 1336          Knee (Therapeutic Exercise)    Knee (Therapeutic Exercise) strengthening exercise  -     Knee Strengthening (Therapeutic Exercise) bilateral;10 repetitions;2 sets;SLR (straight leg raise)  AAROM  -       Row Name 11/09/24 1336          Ankle (Therapeutic Exercise)    Ankle (Therapeutic Exercise) AROM (active range of motion)  -     Ankle AROM (Therapeutic Exercise) right;bilateral;dorsiflexion;20 repititions  -       Row Name             Wound 11/07/24 0345 Left anterior greater trochanter    Wound - Properties Group Placement Date: 11/07/24  -KW Placement Time: 0345  -KW Side: Left  -KW Orientation: anterior  -KW Location: greater trochanter  -KW Present on Original Admission: Y  -KW    Retired Wound - Properties Group Placement Date: 11/07/24  -KW Placement Time: 0345  -KW Present on Original Admission: Y  -KW Side: Left  -KW Orientation: anterior  -KW Location: greater trochanter  -KW    Retired Wound - Properties Group Placement Date: 11/07/24  -KW Placement Time: 0345  -KW Present on Original Admission: Y  -KW Side: Left  -KW Orientation: anterior  -KW Location: greater trochanter  -KW    Retired Wound - Properties Group Date first assessed: 11/07/24  -KW Time first assessed: 0345  -KW Present on Original Admission: Y  -KW Side: Left  -KW Location: greater trochanter  -KW      Row Name             Wound 11/07/24 0346 Right anterior greater trochanter    Wound - Properties Group Placement Date: 11/07/24  -KW Placement Time: 0346  -KW Side: Right  -KW Orientation: anterior  -KW Location: greater trochanter  -KW Present on Original Admission: Y  -KW    Retired Wound - Properties Group Placement Date: 11/07/24  -KW Placement Time: 0346  -KW Present on Original Admission: Y  -KW Side: Right  -KW Orientation: anterior   -KW Location: greater trochanter  -KW    Retired Wound - Properties Group Placement Date: 11/07/24  -KW Placement Time: 0346  -KW Present on Original Admission: Y  -KW Side: Right  -KW Orientation: anterior  -KW Location: greater trochanter  -KW    Retired Wound - Properties Group Date first assessed: 11/07/24  -KW Time first assessed: 0346  -KW Present on Original Admission: Y  -KW Side: Right  -KW Location: greater trochanter  -KW      Row Name             Wound 11/07/24 1107 Right anterior hip    Wound - Properties Group Placement Date: 11/07/24  -KJ Placement Time: 1107  -KJ Side: Right  -KJ Orientation: anterior  -KJ Location: hip  -KJ Primary Wound Type: Incision  -KJ, X3 INCISION SITES     Retired Wound - Properties Group Placement Date: 11/07/24  -KJ Placement Time: 1107  -KJ Side: Right  -KJ Orientation: anterior  -KJ Location: hip  -KJ Primary Wound Type: Incision  -KJ, X3 INCISION SITES     Retired Wound - Properties Group Placement Date: 11/07/24  -KJ Placement Time: 1107  -KJ Side: Right  -KJ Orientation: anterior  -KJ Location: hip  -KJ Primary Wound Type: Incision  -KJ, X3 INCISION SITES     Retired Wound - Properties Group Date first assessed: 11/07/24  -KJ Time first assessed: 1107  -KJ Side: Right  -KJ Location: hip  -KJ Primary Wound Type: Incision  -KJ, X3 INCISION SITES       Row Name 11/09/24 1336          Positioning and Restraints    Pre-Treatment Position sitting in chair/recliner  -VK     Post Treatment Position chair  -VK     In Chair reclined;call light within reach;encouraged to call for assist;exit alarm on;with family/caregiver  -VK       Row Name 11/09/24 1332          Progress Summary (PT)    Progress Toward Functional Goals (PT) progress toward functional goals is fair;progress toward functional goals is good  -VK               User Key  (r) = Recorded By, (t) = Taken By, (c) = Cosigned By      Initials Name Provider Type    Lakshmi Cavanaugh, RN Registered Nurse    YANELI Crain  Valorie RN Registered Nurse    Edith Ovalles PTA Physical Therapist Assistant                    Physical Therapy Education        No education to display                  PT Recommendation and Plan     Progress Summary (PT)  Progress Toward Functional Goals (PT): progress toward functional goals is fair, progress toward functional goals is good   Outcome Measures       Row Name 11/09/24 1400 11/08/24 0900          How much help from another person do you currently need...    Turning from your back to your side while in flat bed without using bedrails? 3  -VK 3  -MEÑO     Moving from lying on back to sitting on the side of a flat bed without bedrails? 2  -VK 3  -MEÑO     Moving to and from a bed to a chair (including a wheelchair)? 3  -VK 3  -MEÑO     Standing up from a chair using your arms (e.g., wheelchair, bedside chair)? 3  -VK 3  -MEÑO     Climbing 3-5 steps with a railing? 2  -VK 2  -MEÑO     To walk in hospital room? 3  -VK 2  -MEÑO     AM-PAC 6 Clicks Score (PT) 16  -VK 16  -MEÑO        Functional Assessment    Outcome Measure Options -- AM-PAC 6 Clicks Basic Mobility (PT)  -MEÑO               User Key  (r) = Recorded By, (t) = Taken By, (c) = Cosigned By      Initials Name Provider Type    Donnell Kelly, PT Physical Therapist    Edith Ovalles PTA Physical Therapist Assistant                     Time Calculation:    PT Charges       Row Name 11/09/24 1413             Time Calculation    PT Received On 11/09/24  -VK         Timed Charges    00584 - PT Therapeutic Exercise Minutes 10  -VK      85351 - Gait Training Minutes  10  -VK      30956 - PT Therapeutic Activity Minutes 7  -VK         Total Minutes    Timed Charges Total Minutes 27  -VK       Total Minutes 27  -VK                User Key  (r) = Recorded By, (t) = Taken By, (c) = Cosigned By      Initials Name Provider Type    Edith Ovalles PTA Physical Therapist Assistant                  Therapy Charges for Today       Code Description Service Date  Service Provider Modifiers Qty    24776876654 HC PT THER PROC EA 15 MIN 11/9/2024 Edith Rodriguez, PTA GP 1    22399800113 HC GAIT TRAINING EA 15 MIN 11/9/2024 Edith Rodriguez, PTA GP 1            PT G-Codes  Outcome Measure Options: AM-PAC 6 Clicks Daily Activity (OT)  AM-PAC 6 Clicks Score (PT): 16  AM-PAC 6 Clicks Score (OT): 16    Edith Rodriguez PTA  11/9/2024

## 2024-11-10 LAB
ALBUMIN SERPL-MCNC: 2.8 G/DL (ref 3.5–5.2)
ANION GAP SERPL CALCULATED.3IONS-SCNC: 10.1 MMOL/L (ref 5–15)
BUN SERPL-MCNC: 41 MG/DL (ref 8–23)
BUN/CREAT SERPL: 34.5 (ref 7–25)
CALCIUM SPEC-SCNC: 8.4 MG/DL (ref 8.2–9.6)
CHLORIDE SERPL-SCNC: 101 MMOL/L (ref 98–107)
CO2 SERPL-SCNC: 23.9 MMOL/L (ref 22–29)
CREAT SERPL-MCNC: 1.19 MG/DL (ref 0.76–1.27)
DEPRECATED RDW RBC AUTO: 42.9 FL (ref 37–54)
EGFRCR SERPLBLD CKD-EPI 2021: 58 ML/MIN/1.73
ERYTHROCYTE [DISTWIDTH] IN BLOOD BY AUTOMATED COUNT: 13.4 % (ref 12.3–15.4)
GLUCOSE SERPL-MCNC: 134 MG/DL (ref 65–99)
HCT VFR BLD AUTO: 31.6 % (ref 37.5–51)
HGB BLD-MCNC: 10.2 G/DL (ref 13–17.7)
MCH RBC QN AUTO: 28.1 PG (ref 26.6–33)
MCHC RBC AUTO-ENTMCNC: 32.3 G/DL (ref 31.5–35.7)
MCV RBC AUTO: 87.1 FL (ref 79–97)
PHOSPHATE SERPL-MCNC: 3.2 MG/DL (ref 2.5–4.5)
PLATELET # BLD AUTO: 258 10*3/MM3 (ref 140–450)
PMV BLD AUTO: 9.4 FL (ref 6–12)
POTASSIUM SERPL-SCNC: 4.3 MMOL/L (ref 3.5–5.2)
RBC # BLD AUTO: 3.63 10*6/MM3 (ref 4.14–5.8)
SODIUM SERPL-SCNC: 135 MMOL/L (ref 136–145)
WBC NRBC COR # BLD AUTO: 11.82 10*3/MM3 (ref 3.4–10.8)

## 2024-11-10 PROCEDURE — 80069 RENAL FUNCTION PANEL: CPT | Performed by: FAMILY MEDICINE

## 2024-11-10 PROCEDURE — 99232 SBSQ HOSP IP/OBS MODERATE 35: CPT | Performed by: FAMILY MEDICINE

## 2024-11-10 PROCEDURE — 85027 COMPLETE CBC AUTOMATED: CPT | Performed by: FAMILY MEDICINE

## 2024-11-10 PROCEDURE — 97110 THERAPEUTIC EXERCISES: CPT

## 2024-11-10 PROCEDURE — 97116 GAIT TRAINING THERAPY: CPT

## 2024-11-10 RX ADMIN — Medication 10 ML: at 08:43

## 2024-11-10 RX ADMIN — TAMSULOSIN HYDROCHLORIDE 0.4 MG: 0.4 CAPSULE ORAL at 20:18

## 2024-11-10 RX ADMIN — NICOTINE 1 PATCH: 21 PATCH, EXTENDED RELEASE TRANSDERMAL at 08:43

## 2024-11-10 RX ADMIN — FERROUS SULFATE TAB 325 MG (65 MG ELEMENTAL FE) 325 MG: 325 (65 FE) TAB at 08:42

## 2024-11-10 RX ADMIN — Medication 10 ML: at 20:19

## 2024-11-10 RX ADMIN — ESCITALOPRAM OXALATE 10 MG: 10 TABLET ORAL at 08:42

## 2024-11-10 RX ADMIN — APIXABAN 2.5 MG: 2.5 TABLET, FILM COATED ORAL at 08:42

## 2024-11-10 RX ADMIN — APIXABAN 2.5 MG: 2.5 TABLET, FILM COATED ORAL at 20:18

## 2024-11-10 NOTE — PLAN OF CARE
Goal Outcome Evaluation:  Plan of Care Reviewed With: patient        Progress: no change  Outcome Evaluation: pt.

## 2024-11-10 NOTE — PROGRESS NOTES
[facility]   Hospitalist Progress Note  Date: 11/10/2024  Patient Name: Charbel Escoto  : 1934  MRN: [id number]  Date of admission: 2024      Subjective   Subjective     Chief Complaint: Follow-up right hip pain    Summary:Charbel Escoto is a 89 y.o. male with with past medical history of hypertension, BPH, arthritis, hearing impairment and GERD was transferred to this facility from [facility] for management of a right hip fracture.  Per son at bedside, last week the patient was chasing a chicken in his backyard and he accidentally fell to the ground hitting his right knee and his head.  Patient had a contusion to the top of his head which resolved after a few days but then he began to have severe right lower extremity pain that limited his mobility and ambulation.  Patient went to PCPs office to have x-rays done which he read as negative but the pain persisted so he was taken to outside facility was found to have a right hip fracture.  Due to lack of orthopedic surgery patient was transferred here for higher level of care.  At our facility patient's vitals were all within normal on arrival.  Labs showed reduced renal function with a mild leukocytosis and elevated INR 1.17.  CT of the right lower extremity showed an acute comminuted displaced right femoral neck fracture.  Patient admitted for further evaluation and treatment.  Orthopedics consulted.  Cardiology consulted for preop clearance.  Patient underwent right hip intertrochanteric nailing.  Tolerated procedure well.  Seen by physical therapy occupational therapy recommending skilled nursing rehab.  Pre-CERT pending at [place].  Family requesting referral be sent to [facility].  Discharge planning coordinating.    Interval Followup: Patient sitting up in bedside chair appears to be resting comfortably with family at the bedside.  Patient denies any new issues. He is eager to get to rehab  to home.  Afebrile  overnight.  Sinus rhythm PVCs 60s to 90s on telemetry review.  Blood pressure within normal limits.  Satting well on room air.  Serum creatinine improved.  Hemoglobin stable.  No other issues per nursing.      Review of Systems  Constitutional: Negative for fatigue and fever.   HENT: Negative for sore throat and trouble swallowing.    Eyes: Negative for pain and discharge.   Respiratory: Negative for cough and shortness of breath.    Cardiovascular: Negative for chest pain and palpitations.   Gastrointestinal: Negative for abdominal pain, nausea and vomiting.   Endocrine: Negative for cold intolerance and heat intolerance.   Genitourinary: Negative for difficulty urinating and dysuria.   Musculoskeletal: Negative for back pain and neck stiffness.  Right hip pain  Skin: Negative for color change and rash.   Neurological: Negative for syncope and headaches.   Hematological: Negative for adenopathy.   Psychiatric/Behavioral: Negative for confusion and hallucinations.    Objective   Objective     Vitals:   Temp:  [97.9 °F (36.6 °C)-98.6 °F (37 °C)] 97.9 °F (36.6 °C)  Heart Rate:  [71-96] 85  Resp:  [18] 18  BP: (129-167)/(58-75) 167/71  Physical Exam   General: well-developed appearing stated age in no acute distress  HEENT: Normocephalic atraumatic moist membranes pupils equal round reactive light, no scleral icterus no conjunctival injection  Cardiovascular: regular rate and rhythm no murmurs rubs or gallops S1-S2, no lower extremity edema appreciated  Pulmonary: Clear to auscultation bilaterally no wheezes rales or rhonchi symmetric chest expansion, unlabored, no conversational dyspnea appreciated  Gastrointestinal: Soft nontender nondistended positive bowel sounds all 4 quadrants no rebound or guarding  Musculoskeletal: No clubbing cyanosis, warm and well-perfused, calves soft symmetric nontender bilaterally  Skin: Clean dry without rashes  Neuro: Cranial nerves II through XII intact grossly no sensorimotor deficits  appreciated bilateral upper and lower extremities  Psych: Patient is calm cooperative and appropriate with exam not responding to internal stimuli  : No Zavala catheter no bladder distention no suprapubic tenderness    Result Review    Result Review:  I have personally reviewed these results and agree with these findings:  [x]  Laboratory  LAB RESULTS:      Lab 11/10/24  0313 11/09/24  0401 11/08/24  0411 11/07/24  0258   WBC 11.82* 12.28*  --  11.36*   HEMOGLOBIN 10.2* 10.0* 10.8* 11.6*   HEMATOCRIT 31.6* 31.5* 34.3* 36.1*   PLATELETS 258 224  --  206   NEUTROS ABS  --   --   --  8.16*   IMMATURE GRANS (ABS)  --   --   --  0.48*   LYMPHS ABS  --   --   --  0.97   MONOS ABS  --   --   --  1.53*   EOS ABS  --   --   --  0.14   MCV 87.1 87.5  --  87.2   PROTIME  --   --   --  15.1*         Lab 11/10/24  0313 11/09/24  0401 11/08/24  0411 11/07/24  0258   SODIUM 135* 132* 133* 135*   POTASSIUM 4.3 4.1 4.5 4.1   CHLORIDE 101 99 101 102   CO2 23.9 21.8* 24.0 21.3*   ANION GAP 10.1 11.2 8.0 11.7   BUN 41* 47* 43* 35*   CREATININE 1.19 1.43* 1.56* 1.34*   EGFR 58.0* 46.5* 41.9* 50.3*   GLUCOSE 134* 128* 120* 116*   CALCIUM 8.4 8.4 8.5 8.8   PHOSPHORUS 3.2 3.5  --   --          Lab 11/10/24  0313 11/09/24  0401 11/07/24  0258   TOTAL PROTEIN  --   --  6.1   ALBUMIN 2.8* 2.8* 3.0*   GLOBULIN  --   --  3.1   ALT (SGPT)  --   --  11   AST (SGOT)  --   --  22   BILIRUBIN  --   --  0.8   ALK PHOS  --   --  57         Lab 11/07/24 0258   PROTIME 15.1*   INR 1.17*             Lab 11/09/24  0401   IRON 20*   IRON SATURATION (TSAT) 10*   TIBC 198*   TRANSFERRIN 133*   FERRITIN 232.40         Brief Urine Lab Results       None          Microbiology Results (last 10 days)       ** No results found for the last 240 hours. **            []  Microbiology  [x]  Radiology  XR Chest 1 View    Result Date: 11/7/2024  No acute infiltrate is appreciated. No cardiac enlargement. Please see above comments for further detail.    Portions of  this note were completed with a voice recognition program.  Electronically Signed By-Aman Mojica MD On:11/7/2024 6:35 AM      CT Lower Extremity Right Without Contrast    Result Date: 11/7/2024  There is an acute comminuted displaced right femoral neck fracture, as discussed. It is impacted. There is a fracture line extending into the subtrochanteric region, as detailed above. No dislocation is seen. Please see above comments for further detail.    Portions of this note were completed with a voice recognition program.  Electronically Signed By-Aman Mojica MD On:11/7/2024 4:43 AM       [x]  EKG/Telemetry   [x]  Cardiology/Vascular   Transthoracic echocardiogram 11/7/2024  Normal left ventricular systolic function.  Mild aortic stenosis.  []  Pathology  []  Old records  [x]  Other:  Scheduled Meds:apixaban, 2.5 mg, Oral, Q12H  escitalopram, 10 mg, Oral, Daily  ferrous sulfate, 325 mg, Oral, Daily With Breakfast  nicotine, 1 patch, Transdermal, Q24H  senna-docusate sodium, 2 tablet, Oral, BID  sodium chloride, 10 mL, Intravenous, Q12H  sodium chloride, 10 mL, Intravenous, Q12H  tamsulosin, 0.4 mg, Oral, Nightly      Continuous Infusions:   PRN Meds:.•  ALPRAZolam  •  senna-docusate sodium **AND** polyethylene glycol **AND** bisacodyl **AND** bisacodyl  •  HYDROcodone-acetaminophen  •  metoprolol tartrate  •  Morphine **AND** naloxone  •  nicotine polacrilex  •  ondansetron ODT **OR** ondansetron  •  senna-docusate sodium **AND** [DISCONTINUED] polyethylene glycol **AND** [DISCONTINUED] bisacodyl **AND** [DISCONTINUED] bisacodyl  •  sodium chloride  •  sodium chloride  •  sodium chloride  •  sodium chloride      Assessment & Plan   Assessment / Plan     Assessment/Plan:  Acute comminuted displaced right femoral neck fracture  Paroxysmal A-fib  BPH  Renal insufficiency unknown chronicity  Normocytic anemia with iron deficiency      Patient admitted for further evaluation and treatment  Orthopedics consulted thank  you for your recommendations  Status post right hip intertrochanteric nailing  Continue pain control with oral analgesics IV for breakthrough  Continue to encourage early activity  Continue to encourage incentive spirometry  Continue physical therapy Occupational Therapy  Continue dressing changes per orthopedics  Continue Eliquis 2.5 mg twice daily  Continue tamsulosin  Repeat renal panel in a.m.  Continue avoid nephrotoxics  Continue oral iron replacement  Further inpatient orders recommendations pending clinical course        Discussed plan with bedside RN as well as social work.    Disposition: Pre-CERT pending at [facility] nursing and rehab.  Family requesting to discharge to [facility].    VTE Prophylaxis:  Pharmacologic VTE prophylaxis orders are present.        CODE STATUS:   Level Of Support Discussed With: Patient  Code Status (Patient has no pulse and is not breathing): CPR (Attempt to Resuscitate)  Medical Interventions (Patient has pulse or is breathing): Full Support

## 2024-11-10 NOTE — PLAN OF CARE
Goal Outcome Evaluation:  Plan of Care Reviewed With: patient        Progress: improving  Outcome Evaluation: Patient is alert and oriented, able to make needs known. Son at bedside. No complaints of pain this shift. He is a one person assist with a walker and gait belt, ambulated 60 feet this shift. Plan is discharge as soon as precert for inpatient rehab goes through. Son will be his ride. Daily dressing changes to hip.     Abigail Barrera RN

## 2024-11-10 NOTE — THERAPY TREATMENT NOTE
Acute Care - Physical Therapy Treatment Note   [place]     Patient Name: Charbel Escoto  : 1934  MRN: [id number]  Today's Date: 11/10/2024      Visit Dx:     ICD-10-CM ICD-9-CM   1. Difficulty in walking  R26.2 719.7   2. Closed fracture of neck of right femur, initial encounter  S72.001A 820.8   3. Decreased activities of daily living (ADL)  Z78.9 V49.89     Patient Active Problem List   Diagnosis    Femoral neck fracture    Essential hypertension    BPH (benign prostatic hyperplasia)     Past Medical History:   Diagnosis Date    Arthritis     Hypertension      Past Surgical History:   Procedure Laterality Date    HIP INTERTROCHANTERIC NAILING Right 2024    Procedure: HIP INTERTROCHANTERIC NAILING;  Surgeon: Jesus Turner MD;  Location: [facility];  Service: Orthopedics;  Laterality: Right;     PT Assessment (Last 12 Hours)       PT Evaluation and Treatment       Row Name 11/10/24 1104          Physical Therapy Time and Intention    Subjective Information complains of;weakness;fatigue;pain  -DK     Document Type therapy note (daily note)  -DK     Mode of Treatment individual therapy;occupational therapy  -DK     Patient Effort good  -DK     Symptoms Noted During/After Treatment fatigue;increased pain  -DK     Comment Pt reports bad knees bilaterally. He is also somewhat [place].  -DK       Row Name 11/10/24 1104          Pain    Pretreatment Pain Rating 0/10 - no pain  -DK     Posttreatment Pain Rating 4/10  -DK     Pain Location hip;knee  -DK     Pain Side/Orientation bilateral  -DK     Pain Management Interventions exercise or physical activity utilized  -DK     Response to Pain Interventions activity participation with increased pain  -DK       Row Name 11/10/24 1104          Cognition    Affect/Mental Status (Cognition) WFL  -DK     Orientation Status (Cognition) oriented to;person;situation  -DK     Follows Commands (Cognition) WFL  -DK     Cognitive Function (Cognition) WFL  -DK     Personal  Safety Interventions gait belt;nonskid shoes/slippers when out of bed;supervised activity  -DK       Row Name 11/10/24 1104          Mobility    Extremity Weight-bearing Status right lower extremity  -DK     Right Lower Extremity (Weight-bearing Status) weight-bearing as tolerated (WBAT)  -DK       Row Name 11/10/24 1104          Transfers    Transfers sit-stand transfer;stand-sit transfer  -DK       Row Name 11/10/24 1104          Sit-Stand Transfer    Sit-Stand [place] (Transfers) standby assist;contact guard;1 person assist  -DK     Assistive Device (Sit-Stand Transfers) walker, front-wheeled  -DK       Row Name 11/10/24 1104          Stand-Sit Transfer    Stand-Sit [place] (Transfers) standby assist;contact guard;1 person assist  -DK     Assistive Device (Stand-Sit Transfers) walker, front-wheeled  -DK       Row Name 11/10/24 1104          Gait/Stairs (Locomotion)    Gait/Stairs Locomotion gait/ambulation independence;gait/ambulation assistive device;distance ambulated;gait pattern  -DK     [place] Level (Gait) standby assist;contact guard;1 person assist  -DK     Assistive Device (Gait) walker, front-wheeled  -DK     Distance in Feet (Gait) 60  -DK     Pattern (Gait) step-to  -DK     Deviations/Abnormal Patterns (Gait) yannick decreased;festinating/shuffling;gait speed decreased;stride length decreased  -DK     Bilateral Gait Deviations forward flexed posture  -DK     Comment, (Gait/Stairs) Pt ambulated on room air with a rolling walker.  He returned to the recliner on alert post treatment.  -       Row Name 11/10/24 1104          Safety Issues/Impairments Affecting Functional Mobility    Safety Issues Affecting Function (Mobility) safety precaution awareness  -DK     Impairments Affecting Function (Mobility) balance;endurance/activity tolerance;pain;strength  hearing  -       Row Name 11/10/24 1104          Balance    Balance Assessment sitting static balance;sitting dynamic balance;standing  static balance;standing dynamic balance  -DK     Static Sitting Balance standby assist  -DK     Dynamic Sitting Balance standby assist  -DK     Position, Sitting Balance unsupported;sitting in chair  -DK     Sit to Stand Dynamic Balance contact guard;1-person assist  -DK     Static Standing Balance contact guard;1-person assist  -DK     Dynamic Standing Balance contact guard;1-person assist  -DK     Position/Device Used, Standing Balance walker, front-wheeled  -DK     Balance Interventions standing;dynamic;tandem gait  -       Row Name 11/10/24 1104          Motor Skills    Motor Skills --  therapeutic exercises  -     Therapeutic Exercise hip;knee;ankle  -       Row Name 11/10/24 1104          Hip (Therapeutic Exercise)    Hip (Therapeutic Exercise) AAROM (active assistive range of motion)  -     Hip AAROM (Therapeutic Exercise) bilateral;flexion;extension;aBduction;aDduction;supine;10 repetitions;2 sets  -       Row Name 11/10/24 1104          Knee (Therapeutic Exercise)    Knee (Therapeutic Exercise) AAROM (active assistive range of motion)  -     Knee AAROM (Therapeutic Exercise) bilateral;flexion;extension;supine;10 repetitions;2 sets  -       Row Name 11/10/24 1104          Ankle (Therapeutic Exercise)    Ankle (Therapeutic Exercise) AAROM (active assistive range of motion)  -     Ankle AAROM (Therapeutic Exercise) bilateral;dorsiflexion;plantarflexion;supine;10 repetitions;2 sets  -       Row Name             Wound 11/07/24 0345 Left anterior greater trochanter    Wound - Properties Group Placement Date: 11/07/24  -KW Placement Time: 0345  -KW Side: Left  -KW Orientation: anterior  -KW Location: greater trochanter  -KW Present on Original Admission: Y  -KW    Retired Wound - Properties Group Placement Date: 11/07/24  -KW Placement Time: 0345  -KW Present on Original Admission: Y  -KW Side: Left  -KW Orientation: anterior  -KW Location: greater trochanter  -KW    Retired Wound - Properties  Group Placement Date: 11/07/24  -KW Placement Time: 0345  -KW Present on Original Admission: Y  -KW Side: Left  -KW Orientation: anterior  -KW Location: greater trochanter  -KW    Retired Wound - Properties Group Date first assessed: 11/07/24  -KW Time first assessed: 0345  -KW Present on Original Admission: Y  -KW Side: Left  -KW Location: greater trochanter  -KW      Row Name             Wound 11/07/24 0346 Right anterior greater trochanter    Wound - Properties Group Placement Date: 11/07/24  -KW Placement Time: 0346  -KW Side: Right  -KW Orientation: anterior  -KW Location: greater trochanter  -KW Present on Original Admission: Y  -KW    Retired Wound - Properties Group Placement Date: 11/07/24  -KW Placement Time: 0346  -KW Present on Original Admission: Y  -KW Side: Right  -KW Orientation: anterior  -KW Location: greater trochanter  -KW    Retired Wound - Properties Group Placement Date: 11/07/24  -KW Placement Time: 0346  -KW Present on Original Admission: Y  -KW Side: Right  -KW Orientation: anterior  -KW Location: greater trochanter  -KW    Retired Wound - Properties Group Date first assessed: 11/07/24  -KW Time first assessed: 0346  -KW Present on Original Admission: Y  -KW Side: Right  -KW Location: greater trochanter  -KW      Row Name             Wound 11/07/24 1107 Right anterior hip    Wound - Properties Group Placement Date: 11/07/24  -KJ Placement Time: 1107  -KJ Side: Right  -KJ Orientation: anterior  -KJ Location: hip  -KJ Primary Wound Type: Incision  -KJ, X3 INCISION SITES     Retired Wound - Properties Group Placement Date: 11/07/24  -KJ Placement Time: 1107  -KJ Side: Right  -KJ Orientation: anterior  -KJ Location: hip  -KJ Primary Wound Type: Incision  -KJ, X3 INCISION SITES     Retired Wound - Properties Group Placement Date: 11/07/24  -KJ Placement Time: 1107  -KJ Side: Right  -KJ Orientation: anterior  -KJ Location: hip  -KJ Primary Wound Type: Incision  -KJ, X3 INCISION SITES     Retired  Wound - Properties Group Date first assessed: 11/07/24  -KJ Time first assessed: 1107  -KJ Side: Right  -KJ Location: hip  -KJ Primary Wound Type: Incision  -KJ, X3 INCISION SITES       Row Name 11/10/24 1104          Plan of Care Review    Plan of Care Reviewed With patient;family  -DK     Progress improving  -DK       Row Name 11/10/24 1104          Positioning and Restraints    Pre-Treatment Position sitting in chair/recliner  -DK     Post Treatment Position chair  -DK     In Chair reclined;call light within reach;encouraged to call for assist;exit alarm on;with family/caregiver;legs elevated;heels elevated  -DK       Row Name 11/10/24 1104          Therapy Assessment/Plan (PT)    Rehab Potential (PT) good  -DK     Criteria for Skilled Interventions Met (PT) skilled treatment is necessary  -DK     Therapy Frequency (PT) daily  -DK     Problem List (PT) problems related to;balance;mobility;strength;pain;hearing  -DK     Activity Limitations Related to Problem List (PT) unable to ambulate safely;unable to transfer safely  -DK       Row Name 11/10/24 1104          Progress Summary (PT)    Progress Toward Functional Goals (PT) progress toward functional goals is good  -DK               User Key  (r) = Recorded By, (t) = Taken By, (c) = Cosigned By      Initials Name Provider Type    Lise Restrepo PTA Physical Therapist Assistant    Lakshmi Cavanaugh, RN Registered Nurse    Valorie Fan RN Registered Nurse                    Physical Therapy Education        No education to display                  PT Recommendation and Plan  Planned Therapy Interventions (PT): balance training, bed mobility training, gait training, strengthening, transfer training  Therapy Frequency (PT): daily  Progress Summary (PT)  Progress Toward Functional Goals (PT): progress toward functional goals is good  Plan of Care Reviewed With: patient, family  Progress: improving   Outcome Measures       Row Name 11/10/24 1103 11/09/24 1400  11/08/24 0900       How much help from another person do you currently need...    Turning from your back to your side while in flat bed without using bedrails? 4  -DK 3  -VK 3  -MEÑO    Moving from lying on back to sitting on the side of a flat bed without bedrails? 3  -DK 2  -VK 3  -MEÑO    Moving to and from a bed to a chair (including a wheelchair)? 3  -DK 3  -VK 3  -MEÑO    Standing up from a chair using your arms (e.g., wheelchair, bedside chair)? 3  -DK 3  -VK 3  -MEÑO    Climbing 3-5 steps with a railing? 2  -DK 2  -VK 2  -MEÑO    To walk in hospital room? 3  -DK 3  -VK 2  -MEÑO    AM-PAC 6 Clicks Score (PT) 18  -DK 16  -VK 16  -MEÑO       Functional Assessment    Outcome Measure Options AM-PAC 6 Clicks Basic Mobility (PT)  -DK -- AM-PAC 6 Clicks Basic Mobility (PT)  -MEÑO              User Key  (r) = Recorded By, (t) = Taken By, (c) = Cosigned By      Initials Name Provider Type    Lise Restrepo PTA Physical Therapist Assistant    Donnell Kelly, PT Physical Therapist    Edith Ovalles PTA Physical Therapist Assistant                     Time Calculation:    PT Charges       Row Name 11/10/24 1110             Time Calculation    PT Received On 11/10/24  -DK      PT Goal Re-Cert Due Date 11/17/24  -DK         Timed Charges    78896 - PT Therapeutic Exercise Minutes 14  -DK      34960 - Gait Training Minutes  8  -DK      88960 - PT Therapeutic Activity Minutes 6  -DK         Total Minutes    Timed Charges Total Minutes 28  -DK       Total Minutes 28  -DK                User Key  (r) = Recorded By, (t) = Taken By, (c) = Cosigned By      Initials Name Provider Type    Lise Restrepo PTA Physical Therapist Assistant                  Therapy Charges for Today       Code Description Service Date Service Provider Modifiers Qty    38843489757 HC PT THER PROC EA 15 MIN 11/10/2024 Lise Kovacs PTA GP 1    72994241090 HC GAIT TRAINING EA 15 MIN 11/10/2024 Lise Kovacs PTA GP 1            PT G-Codes  Outcome  Measure Options: AM-PAC 6 Clicks Basic Mobility (PT)  AM-PAC 6 Clicks Score (PT): 18  AM-PAC 6 Clicks Score (OT): 16    Lise Kovacs, LUCI  11/10/2024

## 2024-11-11 PROCEDURE — 97110 THERAPEUTIC EXERCISES: CPT

## 2024-11-11 PROCEDURE — 97116 GAIT TRAINING THERAPY: CPT

## 2024-11-11 PROCEDURE — 97530 THERAPEUTIC ACTIVITIES: CPT

## 2024-11-11 PROCEDURE — 97535 SELF CARE MNGMENT TRAINING: CPT

## 2024-11-11 PROCEDURE — 99232 SBSQ HOSP IP/OBS MODERATE 35: CPT | Performed by: FAMILY MEDICINE

## 2024-11-11 RX ADMIN — TAMSULOSIN HYDROCHLORIDE 0.4 MG: 0.4 CAPSULE ORAL at 21:01

## 2024-11-11 RX ADMIN — FERROUS SULFATE TAB 325 MG (65 MG ELEMENTAL FE) 325 MG: 325 (65 FE) TAB at 09:18

## 2024-11-11 RX ADMIN — Medication 10 ML: at 09:23

## 2024-11-11 RX ADMIN — Medication 10 ML: at 21:04

## 2024-11-11 RX ADMIN — SENNOSIDES AND DOCUSATE SODIUM 2 TABLET: 50; 8.6 TABLET ORAL at 21:00

## 2024-11-11 RX ADMIN — NICOTINE 1 PATCH: 21 PATCH, EXTENDED RELEASE TRANSDERMAL at 09:20

## 2024-11-11 RX ADMIN — ESCITALOPRAM OXALATE 10 MG: 10 TABLET ORAL at 09:18

## 2024-11-11 RX ADMIN — APIXABAN 2.5 MG: 2.5 TABLET, FILM COATED ORAL at 09:18

## 2024-11-11 RX ADMIN — APIXABAN 2.5 MG: 2.5 TABLET, FILM COATED ORAL at 21:01

## 2024-11-11 RX ADMIN — SENNOSIDES AND DOCUSATE SODIUM 2 TABLET: 50; 8.6 TABLET ORAL at 09:18

## 2024-11-11 NOTE — PROGRESS NOTES
[facility]   Hospitalist Progress Note  Date: 2024  Patient Name: Charbel Escoto  : 1934  MRN: [id number]  Date of admission: 2024      Subjective   Subjective     Chief Complaint: Follow-up right hip pain    Summary:Charbel Escoto is a 89 y.o. male with with past medical history of hypertension, BPH, arthritis, hearing impairment and GERD was transferred to this facility from [facility] for management of a right hip fracture.  Per son at bedside, last week the patient was chasing a chicken in his backyard and he accidentally fell to the ground hitting his right knee and his head.  Patient had a contusion to the top of his head which resolved after a few days but then he began to have severe right lower extremity pain that limited his mobility and ambulation.  Patient went to PCPs office to have x-rays done which he read as negative but the pain persisted so he was taken to outside facility was found to have a right hip fracture.  Due to lack of orthopedic surgery patient was transferred here for higher level of care.  At our facility patient's vitals were all within normal on arrival.  Labs showed reduced renal function with a mild leukocytosis and elevated INR 1.17.  CT of the right lower extremity showed an acute comminuted displaced right femoral neck fracture.  Patient admitted for further evaluation and treatment.  Orthopedics consulted.  Cardiology consulted for preop clearance.  Patient underwent right hip intertrochanteric nailing.  Tolerated procedure well.  Seen by physical therapy occupational therapy recommending skilled nursing rehab.  Pre-CERT pending at [place].  Family requesting referral be sent to [facility].  Discharge planning coordinating.    Interval Followup: Patient sitting up in bedside chair  resting comfortably with family at the bedside.  Patient denies any new issues. He remains eager to get to rehab  to home where his family  works.  Afebrile overnight.  Sinus rhythm PVCs 60s to 70s on telemetry review.  Blood pressure within normal limits.  Satting well on room air.  No other issues per nursing.      Review of Systems  Constitutional: Negative for fatigue and fever.   HENT: Negative for sore throat and trouble swallowing.    Eyes: Negative for pain and discharge.   Respiratory: Negative for cough and shortness of breath.    Cardiovascular: Negative for chest pain and palpitations.   Gastrointestinal: Negative for abdominal pain, nausea and vomiting.   Endocrine: Negative for cold intolerance and heat intolerance.   Genitourinary: Negative for difficulty urinating and dysuria.   Musculoskeletal: Negative for back pain and neck stiffness.  Right hip pain  Skin: Negative for color change and rash.   Neurological: Negative for syncope and headaches.   Hematological: Negative for adenopathy.   Psychiatric/Behavioral: Negative for confusion and hallucinations.    Objective   Objective     Vitals:   Temp:  [98.1 °F (36.7 °C)-99 °F (37.2 °C)] 98.2 °F (36.8 °C)  Heart Rate:  [79-86] 86  Resp:  [15-22] 16  BP: (130-151)/(50-69) 146/66  Physical Exam   General: well-developed appearing stated age in no acute distress  HEENT: Normocephalic atraumatic moist membranes pupils equal round reactive light, no scleral icterus no conjunctival injection  Cardiovascular: regular rate and rhythm no murmurs rubs or gallops S1-S2, no lower extremity edema appreciated  Pulmonary: Clear to auscultation bilaterally no wheezes rales or rhonchi symmetric chest expansion, unlabored, no conversational dyspnea appreciated  Gastrointestinal: Soft nontender nondistended positive bowel sounds all 4 quadrants no rebound or guarding  Musculoskeletal: No clubbing cyanosis, warm and well-perfused, calves soft symmetric nontender bilaterally  Skin: Clean dry without rashes  Neuro: Cranial nerves II through XII intact grossly no sensorimotor deficits appreciated bilateral upper  and lower extremities  Psych: Patient is calm cooperative and appropriate with exam not responding to internal stimuli  : No Zavala catheter no bladder distention no suprapubic tenderness    Result Review    Result Review:  I have personally reviewed these results and agree with these findings:  [x]  Laboratory  LAB RESULTS:      Lab 11/10/24  0313 11/09/24  0401 11/08/24  0411 11/07/24  0258   WBC 11.82* 12.28*  --  11.36*   HEMOGLOBIN 10.2* 10.0* 10.8* 11.6*   HEMATOCRIT 31.6* 31.5* 34.3* 36.1*   PLATELETS 258 224  --  206   NEUTROS ABS  --   --   --  8.16*   IMMATURE GRANS (ABS)  --   --   --  0.48*   LYMPHS ABS  --   --   --  0.97   MONOS ABS  --   --   --  1.53*   EOS ABS  --   --   --  0.14   MCV 87.1 87.5  --  87.2   PROTIME  --   --   --  15.1*         Lab 11/10/24  0313 11/09/24  0401 11/08/24  0411 11/07/24  0258   SODIUM 135* 132* 133* 135*   POTASSIUM 4.3 4.1 4.5 4.1   CHLORIDE 101 99 101 102   CO2 23.9 21.8* 24.0 21.3*   ANION GAP 10.1 11.2 8.0 11.7   BUN 41* 47* 43* 35*   CREATININE 1.19 1.43* 1.56* 1.34*   EGFR 58.0* 46.5* 41.9* 50.3*   GLUCOSE 134* 128* 120* 116*   CALCIUM 8.4 8.4 8.5 8.8   PHOSPHORUS 3.2 3.5  --   --          Lab 11/10/24  0313 11/09/24  0401 11/07/24  0258   TOTAL PROTEIN  --   --  6.1   ALBUMIN 2.8* 2.8* 3.0*   GLOBULIN  --   --  3.1   ALT (SGPT)  --   --  11   AST (SGOT)  --   --  22   BILIRUBIN  --   --  0.8   ALK PHOS  --   --  57         Lab 11/07/24 0258   PROTIME 15.1*   INR 1.17*             Lab 11/09/24  0401   IRON 20*   IRON SATURATION (TSAT) 10*   TIBC 198*   TRANSFERRIN 133*   FERRITIN 232.40         Brief Urine Lab Results       None          Microbiology Results (last 10 days)       ** No results found for the last 240 hours. **            []  Microbiology  [x]  Radiology  XR Chest 1 View    Result Date: 11/7/2024  No acute infiltrate is appreciated. No cardiac enlargement. Please see above comments for further detail.    Portions of this note were completed with a  voice recognition program.  Electronically Signed By-Aman Mojica MD On:11/7/2024 6:35 AM      CT Lower Extremity Right Without Contrast    Result Date: 11/7/2024  There is an acute comminuted displaced right femoral neck fracture, as discussed. It is impacted. There is a fracture line extending into the subtrochanteric region, as detailed above. No dislocation is seen. Please see above comments for further detail.    Portions of this note were completed with a voice recognition program.  Electronically Signed By-Aman Mojica MD On:11/7/2024 4:43 AM       [x]  EKG/Telemetry   [x]  Cardiology/Vascular   Transthoracic echocardiogram 11/7/2024  Normal left ventricular systolic function.  Mild aortic stenosis.  []  Pathology  []  Old records  [x]  Other:  Scheduled Meds:apixaban, 2.5 mg, Oral, Q12H  escitalopram, 10 mg, Oral, Daily  ferrous sulfate, 325 mg, Oral, Daily With Breakfast  nicotine, 1 patch, Transdermal, Q24H  senna-docusate sodium, 2 tablet, Oral, BID  sodium chloride, 10 mL, Intravenous, Q12H  sodium chloride, 10 mL, Intravenous, Q12H  tamsulosin, 0.4 mg, Oral, Nightly      Continuous Infusions:   PRN Meds:.  ALPRAZolam    senna-docusate sodium **AND** polyethylene glycol **AND** bisacodyl **AND** bisacodyl    HYDROcodone-acetaminophen    metoprolol tartrate    Morphine **AND** naloxone    nicotine polacrilex    ondansetron ODT **OR** ondansetron    senna-docusate sodium **AND** [DISCONTINUED] polyethylene glycol **AND** [DISCONTINUED] bisacodyl **AND** [DISCONTINUED] bisacodyl    sodium chloride    sodium chloride    sodium chloride    sodium chloride      Assessment & Plan   Assessment / Plan     Assessment/Plan:  Acute comminuted displaced right femoral neck fracture  Paroxysmal A-fib  BPH  Renal insufficiency unknown chronicity  Normocytic anemia with iron deficiency      Patient admitted for further evaluation and treatment  Orthopedics consulted thank you for your recommendations  Status post  right hip intertrochanteric nailing  Continue pain control with oral analgesics   Continue to encourage early activity  Continue to encourage incentive spirometry  Continue physical therapy Occupational Therapy  Continue dressing changes per orthopedics  Continue Eliquis 2.5 mg twice daily  Continue tamsulosin  Lab holiday tomorrow unless clinical status change  Continue avoid nephrotoxics  Continue oral iron replacement  Further inpatient orders recommendations pending clinical course        Discussed plan with bedside RN as well as social work.    Disposition:  Family requesting to discharge to [facility] where some of the patient's family works.  Discharge planning coordinating    VTE Prophylaxis:  Pharmacologic VTE prophylaxis orders are present.        CODE STATUS:   Level Of Support Discussed With: Patient  Code Status (Patient has no pulse and is not breathing): CPR (Attempt to Resuscitate)  Medical Interventions (Patient has pulse or is breathing): Full Support

## 2024-11-11 NOTE — PLAN OF CARE
Goal Outcome Evaluation:              Outcome Evaluation: No complaints of pain this shift. Ambulating with assistance. Requested rehab in [facility]. Family at bedside. VSS. No significant changes.

## 2024-11-11 NOTE — PLAN OF CARE
Goal Outcome Evaluation:  Plan of Care Reviewed With: patient        Progress: improving  Outcome Evaluation: Pt alert and able to make needs known. No c/o pain or discomfort. Pt urinating without difficulty, utilizing urinal. Pt son at bedside. Pt one person assist with gait belt and rolling walker for transfes, ambulated 20+feet. Pt pending discharge to rehab.

## 2024-11-11 NOTE — THERAPY TREATMENT NOTE
Patient Name: Charbel Escoto  : 1934    MRN: [id number]                              Today's Date: 2024       Admit Date: 2024    Visit Dx:     ICD-10-CM ICD-9-CM   1. Difficulty in walking  R26.2 719.7   2. Closed fracture of neck of right femur, initial encounter  S72.001A 820.8   3. Decreased activities of daily living (ADL)  Z78.9 V49.89     Patient Active Problem List   Diagnosis    Femoral neck fracture    Essential hypertension    BPH (benign prostatic hyperplasia)     Past Medical History:   Diagnosis Date    Arthritis     Hypertension      Past Surgical History:   Procedure Laterality Date    HIP INTERTROCHANTERIC NAILING Right 2024    Procedure: HIP INTERTROCHANTERIC NAILING;  Surgeon: Jesus Turner MD;  Location: [facility] OR;  Service: Orthopedics;  Laterality: Right;      General Information       Row Name 24 1139          OT Time and Intention    Subjective Information weakness;fatigue;pain  -EG     Document Type therapy note (daily note)  -EG     Mode of Treatment individual therapy;occupational therapy  -EG     Patient Effort good  -EG       Row Name 24 1139          General Information    Patient Profile Reviewed yes  -EG     Existing Precautions/Restrictions fall;weight bearing  -EG       Row Name 24 1139          Cognition    Orientation Status (Cognition) oriented to;person;situation  -EG       Row Name 24 1139          Safety Issues/Impairments Affecting Functional Mobility    Impairments Affecting Function (Mobility) balance;endurance/activity tolerance;pain;strength  -EG               User Key  (r) = Recorded By, (t) = Taken By, (c) = Cosigned By      Initials Name Provider Type    EG Radha Parson OT Occupational Therapist                     Mobility/ADL's       Row Name 24 1140          Bed Mobility    Comment, (Bed Mobility) Up in chair upon OT arrival  -EG       Row Name 24 1140          Transfers    Transfers sit-stand  transfer;stand-sit transfer;toilet transfer  -EG     Comment, (Transfers) transfer in and out of recliner chair using RW w/ cues for hand placement  -EG       Row Name 11/11/24 1140          Sit-Stand Transfer    Sit-Stand Nash (Transfers) contact guard;verbal cues  -EG     Assistive Device (Sit-Stand Transfers) walker, front-wheeled  -EG     Comment, (Sit-Stand Transfer) 1x3 STS repetitive functional exercise; cues for hand placement to promote safety  -EG       Row Name 11/11/24 1140          Stand-Sit Transfer    Stand-Sit Nash (Transfers) contact guard;1 person assist  -EG     Assistive Device (Stand-Sit Transfers) walker, front-wheeled  -EG       Row Name 11/11/24 1140          Toilet Transfer    Nash Level (Toilet Transfer) verbal cues;contact guard  -EG     Assistive Device (Toilet Transfer) commode, 3-in-1;grab bars/safety frame  -EG       Row Name 11/11/24 1140          Functional Mobility    Functional Mobility- Ind. Level contact guard assist;verbal cues required;nonverbal cues required (demo/gesture)  -EG     Functional Mobility- Device walker, front-wheeled  -EG     Functional Mobility- Comment Minimal tolerance for mobility noted; RW used to perform approx 30-40ft with slow pace  -EG       Row Name 11/11/24 1140          Activities of Daily Living    BADL Assessment/Intervention toileting  -EG       Row Name 11/11/24 1140          Mobility    Extremity Weight-bearing Status right lower extremity  -EG     Right Lower Extremity (Weight-bearing Status) weight-bearing as tolerated (WBAT)  -EG       Row Name 11/11/24 1140          Toileting Assessment/Training    Nash Level (Toileting) toileting skills;contact guard assist;minimum assist (75% patient effort)  -EG     Comment, (Toileting) Oliver for clothing management secondary to balance  -EG               User Key  (r) = Recorded By, (t) = Taken By, (c) = Cosigned By      Initials Name Provider Type    EG Radha Parson, OT  Occupational Therapist                   Obj/Interventions       [facility] Name 11/11/24 1144          Sensory Assessment (Somatosensory)    Sensory Assessment (Somatosensory) sensation intact  -EG       Row Name 11/11/24 1144          Balance    Balance Interventions standing;sit to stand;supported;static;weight shifting activity;occupation based/functional task  -EG               User Key  (r) = Recorded By, (t) = Taken By, (c) = Cosigned By      Initials Name Provider Type    EG Radha Parson, [place] Occupational Therapist                   Goals/Plan    No documentation.                  Clinical Impression       Row Name 11/11/24 1145          Pain Assessment    Pretreatment Pain Rating 0/10 - no pain  -EG     Posttreatment Pain Rating 3/10  -EG     Pain Location knee;hip  -EG     Pain Side/Orientation right  -EG       Row Name 11/11/24 1145          Plan of Care Review    Plan of Care Reviewed With patient  -EG     Progress improving  -EG     Outcome Evaluation Pleasant and cooperative; minimal complaints of pain after mobility performance/participation; Expressing concern for constipation during session unable to have a bowel movement per his report; Demonstrating improved balance and endurance with mobility this date, could benefit from continued skilled services; OT will continue to follow POC; Ax1 w/RW and gait belt.  -EG       [facility] Name 11/11/24 1145          Therapy Assessment/Plan (OT)    Rehab Potential (OT) good  -EG     Criteria for Skilled Therapeutic Interventions Met (OT) yes;meets criteria;skilled treatment is necessary  -EG     Therapy Frequency (OT) 5 times/wk  -EG       Row Name 11/11/24 1145          Therapy Plan Review/Discharge Plan (OT)    Anticipated Discharge Disposition (OT) skilled nursing facility  -EG       Row Name 11/11/24 1145          Positioning and Restraints    Pre-Treatment Position sitting in chair/recliner  -EG     Post Treatment Position chair  -EG     In Chair  reclined;sitting;call light within reach;encouraged to call for assist;exit alarm on  -EG               User Key  (r) = Recorded By, (t) = Taken By, (c) = Cosigned By      Initials Name Provider Type    Radha Zayas OT Occupational Therapist                   Outcome Measures       Row Name 11/11/24 1149          How much help from another is currently needed...    Putting on and taking off regular lower body clothing? 2  -EG     Bathing (including washing, rinsing, and drying) 2  -EG     Toileting (which includes using toilet bed pan or urinal) 2  -EG     Putting on and taking off regular upper body clothing 3  -EG     Taking care of personal grooming (such as brushing teeth) 3  -EG     Eating meals 4  -EG     AM-PAC 6 Clicks Score (OT) 16  -EG       Row Name 11/11/24 1149          Functional Assessment    Outcome Measure Options AM-PAC 6 Clicks Daily Activity (OT);Optimal Instrument  -EG       Row Name 11/11/24 1149          Optimal Instrument    Optimal Instrument Optimal - 3  -EG     Bending/Stooping 3  -EG     Standing 2  -EG     Reaching 1  -EG     From the list, choose the 3 activities you would most like to be able to do without any difficulty Standing;Reaching;Bending/stooping  -EG     Total Score Optimal - 3 6  -EG               User Key  (r) = Recorded By, (t) = Taken By, (c) = Cosigned By      Initials Name Provider Type    Radha Zayas OT Occupational Therapist                    Occupational Therapy Education        No education to display                  OT Recommendation and Plan  Therapy Frequency (OT): 5 times/wk  Plan of Care Review  Plan of Care Reviewed With: patient  Progress: improving  Outcome Evaluation: Pleasant and cooperative; minimal complaints of pain after mobility performance/participation; Expressing concern for constipation during session unable to have a bowel movement per his report; Demonstrating improved balance and endurance with mobility this date, could  benefit from continued skilled services; OT will continue to follow POC; Ax1 w/RW and gait belt.     Time Calculation:         Time Calculation- OT       Row Name 11/11/24 1150             Time Calculation- OT    OT Received On 11/11/24  -EG      OT Goal Re-Cert Due Date 11/17/24  -EG         Timed Charges    22254 - OT Therapeutic Activity Minutes 13  -EG      99843 - OT Self Care/Mgmt Minutes 11  -EG         Total Minutes    Timed Charges Total Minutes 24  -EG       Total Minutes 24  -EG                User Key  (r) = Recorded By, (t) = Taken By, (c) = Cosigned By      Initials Name Provider Type    EG Radha Parson OT Occupational Therapist                  Therapy Charges for Today       Code Description Service Date Service Provider Modifiers Qty    06920167087 HC OT THERAPEUTIC ACT EA 15 MIN 11/11/2024 Radha Parson OT GO 1    [id number] HC OT SELF CARE/MGMT/TRAIN EA 15 MIN 11/11/2024 Radha Parson OT GO 1                 Radha Parson OT  11/11/2024

## 2024-11-11 NOTE — THERAPY TREATMENT NOTE
Acute Care - Physical Therapy Treatment Note   [place]     Patient Name: Charbel Escoto  : 1934  MRN: [id number]  Today's Date: 2024      Visit Dx:     ICD-10-CM ICD-9-CM   1. Difficulty in walking  R26.2 719.7   2. Closed fracture of neck of right femur, initial encounter  S72.001A 820.8   3. Decreased activities of daily living (ADL)  Z78.9 V49.89     Patient Active Problem List   Diagnosis    Femoral neck fracture    Essential hypertension    BPH (benign prostatic hyperplasia)     Past Medical History:   Diagnosis Date    Arthritis     Hypertension      Past Surgical History:   Procedure Laterality Date    HIP INTERTROCHANTERIC NAILING Right 2024    Procedure: HIP INTERTROCHANTERIC NAILING;  Surgeon: Jesus Turner MD;  Location: [facility];  Service: Orthopedics;  Laterality: Right;     PT Assessment (Last 12 Hours)       PT Evaluation and Treatment       Row Name 24 1108          Physical Therapy Time and Intention    Subjective Information complains of;fatigue  -VK     Document Type therapy note (daily note)  -VK     Mode of Treatment individual therapy;physical therapy  -VK     Patient Effort good  -VK       Row Name 24 1108          General Information    Patient Profile Reviewed yes  -VK     Existing Precautions/Restrictions fall;weight bearing  -VK       Row Name 24 1108          Pain    Pretreatment Pain Rating 0/10 - no pain  -VK     Posttreatment Pain Rating 0/10 - no pain  -VK       Row Name 24 1108          Cognition    Affect/Mental Status (Cognition) WFL  -VK     Orientation Status (Cognition) oriented to;person;situation  -VK     Personal Safety Interventions nonskid shoes/slippers when out of bed;gait belt;fall prevention program maintained  -VK       Row Name 24 1108          Mobility    Extremity Weight-bearing Status right lower extremity  -VK     Right Lower Extremity (Weight-bearing Status) weight-bearing as tolerated (WBAT)  -VK        Row Name 11/11/24 1108          Sit-Stand Transfer    Sit-Stand [place] (Transfers) contact guard;minimum assist (75% patient effort);verbal cues  -VK     Assistive Device (Sit-Stand Transfers) walker, front-wheeled  -VK       Row Name 11/11/24 1108          Stand-Sit Transfer    Stand-Sit [place] (Transfers) contact guard;minimum assist (75% patient effort);verbal cues  -VK     Assistive Device (Stand-Sit Transfers) walker, front-wheeled  -VK       Row Name 11/11/24 1108          Gait/Stairs (Locomotion)    [place] Level (Gait) contact guard;minimum assist (75% patient effort);verbal cues  -VK     Assistive Device (Gait) walker, front-wheeled  -VK     Patient was able to Ambulate yes  -VK     Distance in Feet (Gait) 42  -VK     Pattern (Gait) step-to  -VK     Deviations/Abnormal Patterns (Gait) antalgic;yannick decreased;gait speed decreased;stride length decreased;festinating/shuffling  -VK     Bilateral Gait Deviations forward flexed posture;heel strike decreased  -VK     Right Sided Gait Deviations weight shift ability decreased  -VK       Row Name 11/11/24 1108          Safety Issues/Impairments Affecting Functional Mobility    Impairments Affecting Function (Mobility) balance;endurance/activity tolerance;pain;strength  Hearing  -VK       Row Name 11/11/24 1108          Balance    Sit to Stand Dynamic Balance contact guard;minimal assist  -VK     Static Standing Balance contact guard  -VK     Dynamic Standing Balance contact guard;minimal assist  -VK     Position/Device Used, Standing Balance walker, front-wheeled  -VK     Balance Interventions sit to stand  -VK       Row Name 11/11/24 1108          Hip (Therapeutic Exercise)    Hip AAROM (Therapeutic Exercise) right;flexion;10 repetitions;2 sets  -VK     Hip Isometrics (Therapeutic Exercise) aDduction;10 repetitions;3 sets  -VK       Row Name 11/11/24 1108          Knee (Therapeutic Exercise)    Knee (Therapeutic Exercise) isometric  exercises;strengthening exercise  -     Knee Isometrics (Therapeutic Exercise) quad sets;10 repetitions;2 sets  -     Knee Strengthening (Therapeutic Exercise) right;LAQ (long arc quad);SLR (straight leg raise);10 repetitions;2 sets;heel slides  Used leg ELIDA for SLR.  -       Row Name 11/11/24 1108          Ankle (Therapeutic Exercise)    Ankle AROM (Therapeutic Exercise) bilateral;dorsiflexion;30 repititions  -       Row Name             Wound 11/07/24 0345 Left anterior greater trochanter    Wound - Properties Group Placement Date: 11/07/24  -KW Placement Time: 0345  -KW Side: Left  -KW Orientation: anterior  -KW Location: greater trochanter  -KW Present on Original Admission: Y  -KW    Retired Wound - Properties Group Placement Date: 11/07/24  -KW Placement Time: 0345  -KW Present on Original Admission: Y  -KW Side: Left  -KW Orientation: anterior  -KW Location: greater trochanter  -KW    Retired Wound - Properties Group Placement Date: 11/07/24  -KW Placement Time: 0345  -KW Present on Original Admission: Y  -KW Side: Left  -KW Orientation: anterior  -KW Location: greater trochanter  -KW    Retired Wound - Properties Group Date first assessed: 11/07/24  -KW Time first assessed: 0345  -KW Present on Original Admission: Y  -KW Side: Left  -KW Location: greater trochanter  -KW      Row Name             Wound 11/07/24 0346 Right anterior greater trochanter    Wound - Properties Group Placement Date: 11/07/24  -KW Placement Time: 0346  -KW Side: Right  -KW Orientation: anterior  -KW Location: greater trochanter  -KW Present on Original Admission: Y  -KW    Retired Wound - Properties Group Placement Date: 11/07/24  -KW Placement Time: 0346  -KW Present on Original Admission: Y  -KW Side: Right  -KW Orientation: anterior  -KW Location: greater trochanter  -KW    Retired Wound - Properties Group Placement Date: 11/07/24  -KW Placement Time: 0346  -KW Present on Original Admission: Y  -KW Side: Right   -KW Orientation: anterior  -KW Location: greater trochanter  -KW    Retired Wound - Properties Group Date first assessed: 11/07/24  -KW Time first assessed: 0346  -KW Present on Original Admission: Y  -KW Side: Right  -KW Location: greater trochanter  -KW      Row Name             Wound 11/07/24 1107 Right anterior hip    Wound - Properties Group Placement Date: 11/07/24  -KJ Placement Time: 1107  -KJ Side: Right  -KJ Orientation: anterior  -KJ Location: hip  -KJ Primary Wound Type: Incision  -KJ, X3 INCISION SITES     Retired Wound - Properties Group Placement Date: 11/07/24  -KJ Placement Time: 1107  -KJ Side: Right  -KJ Orientation: anterior  -KJ Location: hip  -KJ Primary Wound Type: Incision  -KJ, X3 INCISION SITES     Retired Wound - Properties Group Placement Date: 11/07/24  -KJ Placement Time: 1107  -KJ Side: Right  -KJ Orientation: anterior  -KJ Location: hip  -KJ Primary Wound Type: Incision  -KJ, X3 INCISION SITES     Retired Wound - Properties Group Date first assessed: 11/07/24  -KJ Time first assessed: 1107  -KJ Side: Right  -KJ Location: hip  -KJ Primary Wound Type: Incision  -KJ, X3 INCISION SITES       Row Name 11/11/24 1108          Positioning and Restraints    Pre-Treatment Position sitting in chair/recliner  -VK     Post Treatment Position chair  -VK     In Chair reclined;call light within reach;encouraged to call for assist;exit alarm on;with family/caregiver  -VK       Row Name 11/11/24 1108          Progress Summary (PT)    Progress Toward Functional Goals (PT) progress toward functional goals is good  -VK     Daily Progress Summary (PT) Pt agreeable to treatment. Worked on transfers, ambulation, and therex today, all tolerated well by pt. Pt's bilateral knees continue to give him trouble/limit his gait distance. Pt somewhat unsteady throughout gait training, therefore increasing his risks for falls. Pt would benefit from continued skilled PT services.  -VK               User Key  (r) =  Recorded By, (t) = Taken By, (c) = Cosigned By      Initials Name Provider Type    Lakshmi Cavanaugh, RN Registered Nurse    Valorie Fan, RN Registered Nurse    Edith Ovalles PTA Physical Therapist Assistant                    Physical Therapy Education        No education to display                  PT Recommendation and Plan     Progress Summary (PT)  Progress Toward Functional Goals (PT): progress toward functional goals is good  Daily Progress Summary (PT): Pt agreeable to treatment. Worked on transfers, ambulation, and therex today, all tolerated well by pt. Pt's bilateral knees continue to give him trouble/limit his gait distance. Pt somewhat unsteady throughout gait training, therefore increasing his risks for falls. Pt would benefit from continued skilled PT services.   Outcome Measures       Row Name 11/11/24 1200 11/10/24 1103 11/09/24 1400       How much help from another person do you currently need...    Turning from your back to your side while in flat bed without using bedrails? 3  -VK 4  -DK 3  -VK    Moving from lying on back to sitting on the side of a flat bed without bedrails? 2  -VK 3  -DK 2  -VK    Moving to and from a bed to a chair (including a wheelchair)? 3  -VK 3  -DK 3  -VK    Standing up from a chair using your arms (e.g., wheelchair, bedside chair)? 3  -VK 3  -DK 3  -VK    Climbing 3-5 steps with a railing? 2  -VK 2  -DK 2  -VK    To walk in hospital room? 3  -VK 3  -DK 3  -VK    AM-PAC 6 Clicks Score (PT) 16  -VK 18  -DK 16  -VK       Functional Assessment    Outcome Measure Options -- AM-PAC 6 Clicks Basic Mobility (PT)  -DK --              User Key  (r) = Recorded By, (t) = Taken By, (c) = Cosigned By      Initials Name Provider Type    Lise Restrepo PTA Physical Therapist Assistant    Edith Ovalles PTA Physical Therapist Assistant                     Time Calculation:    PT Charges       Row Name 11/11/24 1236             Time Calculation    PT Received On  11/11/24  -VK         Timed Charges    30585 - PT Therapeutic Exercise Minutes 11  -VK      98843 - Gait Training Minutes  9  -VK      05968 - PT Therapeutic Activity Minutes 5  -VK         Total Minutes    Timed Charges Total Minutes 25  -VK       Total Minutes 25  -VK                User Key  (r) = Recorded By, (t) = Taken By, (c) = Cosigned By      Initials Name Provider Type    Edith Ovalles PTA Physical Therapist Assistant                  Therapy Charges for Today       Code Description Service Date Service Provider Modifiers Qty    62408209566 HC PT THER PROC EA 15 MIN 11/11/2024 Edith Rodriguez PTA GP 1    37122064998 HC GAIT TRAINING EA 15 MIN 11/11/2024 Edith Rodriguez PTA GP 1            PT G-Codes  Outcome Measure Options: AM-PAC 6 Clicks Daily Activity (OT), Optimal Instrument  AM-PAC 6 Clicks Score (PT): 16  AM-PAC 6 Clicks Score (OT): 16    Edith Rodrigeuz PTA  11/11/2024

## 2024-11-12 VITALS
HEART RATE: 87 BPM | OXYGEN SATURATION: 96 % | SYSTOLIC BLOOD PRESSURE: 143 MMHG | TEMPERATURE: 98.1 F | RESPIRATION RATE: 18 BRPM | DIASTOLIC BLOOD PRESSURE: 74 MMHG

## 2024-11-12 PROCEDURE — 99239 HOSP IP/OBS DSCHRG MGMT >30: CPT | Performed by: INTERNAL MEDICINE

## 2024-11-12 PROCEDURE — 97530 THERAPEUTIC ACTIVITIES: CPT

## 2024-11-12 PROCEDURE — 97112 NEUROMUSCULAR REEDUCATION: CPT

## 2024-11-12 RX ORDER — HYDROCODONE BITARTRATE AND ACETAMINOPHEN 5; 325 MG/1; MG/1
1 TABLET ORAL EVERY 6 HOURS PRN
Start: 2024-11-12 | End: 2024-11-13

## 2024-11-12 RX ORDER — FERROUS SULFATE 325(65) MG
325 TABLET ORAL
Start: 2024-11-13

## 2024-11-12 RX ORDER — ALPRAZOLAM 0.5 MG
0.5 TABLET ORAL 4 TIMES DAILY PRN
Start: 2024-11-12

## 2024-11-12 RX ORDER — NICOTINE 21 MG/24HR
1 PATCH, TRANSDERMAL 24 HOURS TRANSDERMAL
Start: 2024-11-13

## 2024-11-12 RX ORDER — POLYETHYLENE GLYCOL 3350 17 G
2 POWDER IN PACKET (EA) ORAL
Start: 2024-11-12

## 2024-11-12 RX ADMIN — Medication 10 ML: at 09:17

## 2024-11-12 RX ADMIN — SENNOSIDES AND DOCUSATE SODIUM 2 TABLET: 50; 8.6 TABLET ORAL at 09:16

## 2024-11-12 RX ADMIN — HYDROCODONE BITARTRATE AND ACETAMINOPHEN 1 TABLET: 5; 325 TABLET ORAL at 13:35

## 2024-11-12 RX ADMIN — ESCITALOPRAM OXALATE 10 MG: 10 TABLET ORAL at 09:16

## 2024-11-12 RX ADMIN — FERROUS SULFATE TAB 325 MG (65 MG ELEMENTAL FE) 325 MG: 325 (65 FE) TAB at 09:16

## 2024-11-12 RX ADMIN — APIXABAN 2.5 MG: 2.5 TABLET, FILM COATED ORAL at 09:16

## 2024-11-12 NOTE — DISCHARGE SUMMARY
[facility]         HOSPITALIST  DISCHARGE SUMMARY    Patient Name: Charbel Escoto  : 1934  MRN: [id number]    Date of Admission: 2024  Date of Discharge:  2024  Primary Care Physician: Dagmar Montez,     Consults       Date and Time Order Name Status Description    2024  2:30 AM Inpatient Cardiology Consult      2024  2:30 AM Inpatient Orthopedic Surgery Consult              Active and Resolved Hospital Problems:  Acute comminuted displaced right femoral neck fracture  Paroxysmal A-fib  BPH  Renal insufficiency unknown chronicity  Normocytic anemia with iron deficiency     Hospital Course     Hospital Course:  Charbel Escoto is a 89 y.o. male with with past medical history of hypertension, BPH, arthritis, hearing impairment and GERD was transferred to this facility from [facility] for management of a right hip fracture.  Per son at bedside, last week the patient was chasing a chicken in his backyard and he accidentally fell to the ground hitting his right knee and his head.  Patient had a contusion to the top of his head which resolved after a few days but then he began to have severe right lower extremity pain that limited his mobility and ambulation.  Patient went to PCPs office to have x-rays done which he read as negative but the pain persisted so he was taken to outside facility was found to have a right hip fracture.  Due to lack of orthopedic surgery patient was transferred here for higher level of care.  At our facility patient's vitals were all within normal on arrival.  Labs showed reduced renal function with a mild leukocytosis and elevated INR 1.17.  CT of the right lower extremity showed an acute comminuted displaced right femoral neck fracture.  Patient admitted for further evaluation and treatment.  Orthopedics consulted.  Cardiology consulted for preop clearance.  Patient underwent right hip intertrochanteric nailing.  Tolerated  procedure well.  Seen by physical therapy occupational therapy recommending skilled nursing rehab.  Patient was accepted to rehab and is discharged today in stable condition    Day of Discharge     Vital Signs:  Temp:  [97.5 °F (36.4 °C)-99 °F (37.2 °C)] 98.1 °F (36.7 °C)  Heart Rate:  [74-91] 91  Resp:  [16-18] 16  BP: (142-156)/(66-78) 142/75    Physical Exam:   GEN: No acute distress  HEENT: Moist mucous membranes  LUNGS: Equal chest rise bilaterally  CARDIAC: Regular rate and rhythm  NEURO: Moving all 4 extremities spontaneously  SKIN: No obvious breakdown    Discharge Details        Discharge Medications        New Medications        Instructions Start Date   ALPRAZolam 0.5 MG tablet  Commonly known as: XANAX   0.5 mg, Oral, 4 Times Daily PRN      apixaban 2.5 MG tablet tablet  Commonly known as: ELIQUIS   2.5 mg, Oral, Every 12 Hours Scheduled      ferrous sulfate 325 (65 FE) MG tablet   325 mg, Oral, Daily With Breakfast   Start Date: November 13, 2024     HYDROcodone-acetaminophen 5-325 MG per tablet  Commonly known as: NORCO   1 tablet, Oral, Every 6 Hours PRN      nicotine 21 MG/24HR patch  Commonly known as: NICODERM CQ   1 patch, Transdermal, Every 24 Hours Scheduled   Start Date: November 13, 2024     nicotine polacrilex 2 MG lozenge  Commonly known as: COMMIT   2 mg, Mouth/Throat, Every 1 Hour PRN             Continue These Medications        Instructions Start Date   escitalopram 10 MG tablet  Commonly known as: LEXAPRO   10 mg, Oral, Daily      furosemide 20 MG tablet  Commonly known as: LASIX   20 mg, Oral, Daily      tamsulosin 0.4 MG capsule 24 hr capsule  Commonly known as: FLOMAX   1 capsule, Oral, Daily             Stop These Medications      lisinopril 5 MG tablet  Commonly known as: PRINIVIL,ZESTRIL     potassium chloride 10 MEQ CR tablet  Commonly known as: KLOR-CON M10              No Known Allergies    Discharge Disposition:  Short Term Hospital (DC - External)    Diet:  Hospital:  Diet  Order   Procedures    Diet: Regular/House; Texture: Soft to Chew (NDD 3); Soft to Chew: Whole Meat; Fluid Consistency: Thin (IDDSI 0)       Discharge Activity:       CODE STATUS:  Code Status and Medical Interventions: CPR (Attempt to Resuscitate); Full Support   Ordered at: 11/07/24 0243     Level Of Support Discussed With:    Patient     Code Status (Patient has no pulse and is not breathing):    CPR (Attempt to Resuscitate)     Medical Interventions (Patient has pulse or is breathing):    Full Support       Future Appointments   Date Time Provider Department Center   11/20/2024  9:45 AM Dena Trivedi APRN [facility]       Additional Instructions for the Follow-ups that You Need to Schedule       Discharge Follow-up with PCP   As directed       Currently Documented PCP:    Dagmar Montez DO    PCP Phone Number:    [phone]     Follow Up Details: 3 to 7 days        Discharge Follow-up with Specified Provider: ortho; 2 Weeks   As directed      To: ortho   Follow Up: 2 Weeks                Pertinent  and/or Most Recent Results     IMAGING:  FL Surgery Fluoro    Result Date: 11/7/2024  This procedure was auto-finalized with no dictation required.    Adult Transthoracic Echo Complete W/ Cont if Necessary Per Protocol    Result Date: 11/7/2024  Normal left ventricular systolic function. Mild aortic stenosis.     XR Chest 1 View    Result Date: 11/7/2024  XR CHEST 1 VW-  Date of exam: 11/7/2024 5:40 AM.  Indication: Preop. right LUDIVINA; h/o fall & right femoral neck fracture.  Comparison: None available.  FINDINGS: Two views of the chest (AP upright portable projections) reveal no cardiac enlargement and no acute infiltrate. No pleural effusion or pneumothorax is seen. The thoracic aorta is atherosclerotic and ectatic. External artifacts are noted. Chronic calcified granulomatous disease of the chest is suggested. There are degenerative changes of the shoulders and spine.       No acute infiltrate is  appreciated. No cardiac enlargement. Please see above comments for further detail.    Portions of this note were completed with a voice recognition program.  Electronically Signed By-Aman Mojica MD On:11/7/2024 6:35 AM      CT Lower Extremity Right Without Contrast    Result Date: 11/7/2024  CT LOWER EXTREMITY RIGHT WO CONTRAST-  Date of exam: 11/7/2024, 4:04 A.M.  Indications: hip fracture; Orthopedics (requesting prior to surgery)  Comparison: None available.  TECHNIQUE: Axial CT images were obtained of the right lower extremity without contrast administration. Reconstructed 2D coronal and sagittal images were also obtained. Automated exposure control and iterative construction methods were used.  FINDINGS: There is an acute posteriorly displaced (8 mm) comminuted extra-articular closed right femoral neck fracture. Again, the large distal fracture fragment is displaced posteriorly approximately 8 mm. Also, the fracture is impacted. There is a vertical fracture line extending through the intertrochanteric region to the subtrochanteric area; it extends approximately 4 cm inferior to the apex of the right lesser trochanter. There is extensive comminution at the impaction site. There may be a tiny avulsion fracture involving the lesser trochanter (such as seen on image 66 of series 5 and image 56 of series 4). In the differential diagnosis would be an avulsion fracture. No dislocation is seen. There are suspected old healed superior and inferior ischio-pubic rami fractures on the right. Acute soft tissue contusion is centered about the fractures involving the proximal right femur.       There is an acute comminuted displaced right femoral neck fracture, as discussed. It is impacted. There is a fracture line extending into the subtrochanteric region, as detailed above. No dislocation is seen. Please see above comments for further detail.    Portions of this note were completed with a voice recognition program.   Electronically Signed By-Aman Mojica MD On:11/7/2024 4:43 AM        LAB RESULTS:      Lab 11/10/24  0313 11/09/24 0401 11/08/24 0411 11/07/24 0258   WBC 11.82* 12.28*  --  11.36*   HEMOGLOBIN 10.2* 10.0* 10.8* 11.6*   HEMATOCRIT 31.6* 31.5* 34.3* 36.1*   PLATELETS 258 224  --  206   NEUTROS ABS  --   --   --  8.16*   IMMATURE GRANS (ABS)  --   --   --  0.48*   LYMPHS ABS  --   --   --  0.97   MONOS ABS  --   --   --  1.53*   EOS ABS  --   --   --  0.14   MCV 87.1 87.5  --  87.2   PROTIME  --   --   --  15.1*         Lab 11/10/24  0313 11/09/24  0401 11/08/24  0411 11/07/24  0258   SODIUM 135* 132* 133* 135*   POTASSIUM 4.3 4.1 4.5 4.1   CHLORIDE 101 99 101 102   CO2 23.9 21.8* 24.0 21.3*   ANION GAP 10.1 11.2 8.0 11.7   BUN 41* 47* 43* 35*   CREATININE 1.19 1.43* 1.56* 1.34*   EGFR 58.0* 46.5* 41.9* 50.3*   GLUCOSE 134* 128* 120* 116*   CALCIUM 8.4 8.4 8.5 8.8   PHOSPHORUS 3.2 3.5  --   --          Lab 11/10/24  0313 11/09/24  0401 11/07/24  0258   TOTAL PROTEIN  --   --  6.1   ALBUMIN 2.8* 2.8* 3.0*   GLOBULIN  --   --  3.1   ALT (SGPT)  --   --  11   AST (SGOT)  --   --  22   BILIRUBIN  --   --  0.8   ALK PHOS  --   --  57         Lab 11/07/24 0258   PROTIME 15.1*   INR 1.17*             Lab 11/09/24 0401   IRON 20*   IRON SATURATION (TSAT) 10*   TIBC 198*   TRANSFERRIN 133*   FERRITIN 232.40         Brief Urine Lab Results       None          Microbiology Results (last 10 days)       ** No results found for the last 240 hours. **              Results for orders placed during the hospital encounter of 11/07/24    Adult Transthoracic Echo Complete W/ Cont if Necessary Per Protocol    Interpretation Summary  Normal left ventricular systolic function.  Mild aortic stenosis.      Time spent on Discharge including face to face service: Greater than 30 minutes      Electronically signed by Jesus Kendrick MD, 11/12/24, 11:26 AM EST.

## 2024-11-12 NOTE — PLAN OF CARE
Goal Outcome Evaluation:  Plan of Care Reviewed With: patient        Progress: improving  Outcome Evaluation: Pt A&Ox4, VSS. Pt voiced no complaints of pain during this shift. Pt has stood to urinal several times during night. Pt awaiting rehab placement.

## 2024-11-12 NOTE — PLAN OF CARE
Goal Outcome Evaluation:  Plan of Care Reviewed With: patient, child           Outcome Evaluation: Patient alert and oriented x 4, [place], calm, cooperative, and pleasant.  VS WNL.  LCTA.  No complaints of pain this shift.  Patient participated in therapy services.  Dsgs changed to R hip x 3.  No s/sx of infection noted.  Patient tolerated well.  Patient transferring to [facility].  Report provided to Nurse Gupta.  Patient discharged with all personal beongings with son whi is transporting patient to [facility] in [place].

## 2024-11-12 NOTE — THERAPY TREATMENT NOTE
Patient Name: Charbel Escoto  : 1934    MRN: [id number]                              Today's Date: 2024       Admit Date: 2024    Visit Dx:     ICD-10-CM ICD-9-CM   1. Difficulty in walking  R26.2 719.7   2. Closed fracture of neck of right femur, initial encounter  S72.001A 820.8   3. Decreased activities of daily living (ADL)  Z78.9 V49.89   4. Anxiety disorder, unspecified type  F41.9 300.00     Patient Active Problem List   Diagnosis    Femoral neck fracture    Essential hypertension    BPH (benign prostatic hyperplasia)     Past Medical History:   Diagnosis Date    Arthritis     Hypertension      Past Surgical History:   Procedure Laterality Date    HIP INTERTROCHANTERIC NAILING Right 2024    Procedure: HIP INTERTROCHANTERIC NAILING;  Surgeon: Jesus Turner MD;  Location: [facility] OR;  Service: Orthopedics;  Laterality: Right;      General Information       Row Name 24 1000          OT Time and Intention    Document Type therapy note (daily note)  -EG     Mode of Treatment individual therapy;occupational therapy  -EG       Row Name 24 1000          General Information    Existing Precautions/Restrictions fall;weight bearing  -EG       Row Name 24 1000          Cognition    Orientation Status (Cognition) oriented to;person;situation  -EG       Row Name 24 1000          Safety Issues/Impairments Affecting Functional Mobility    Impairments Affecting Function (Mobility) balance;endurance/activity tolerance;pain;strength  -EG               User Key  (r) = Recorded By, (t) = Taken By, (c) = Cosigned By      Initials Name Provider Type    EG Radha Parson OT Occupational Therapist                     Mobility/ADL's       Row Name 24 1000          Bed Mobility    Comment, (Bed Mobility) up in chair upon OT arrival  -EG       Row Name 24 1000          Transfers    Transfers sit-stand transfer;stand-sit transfer  -EG     Comment, (Transfers) Transfer  in and out of recliner chair using RW  -EG       Row Name 11/12/24 1000          Sit-Stand Transfer    Sit-Stand [place] (Transfers) contact guard;verbal cues  -EG     Assistive Device (Sit-Stand Transfers) walker, front-wheeled  -EG     Comment, (Sit-Stand Transfer) 2x5 for functional repetitive exercise performance; fatigues easily requiring rest breaks inbetween sets  -EG       Row Name 11/12/24 1000          Stand-Sit Transfer    Stand-Sit [place] (Transfers) contact guard;1 person assist  -EG     Assistive Device (Stand-Sit Transfers) walker, front-wheeled  -EG     Comment, (Stand-Sit Transfer) 2x5 for functional repetitive exercise performance; fatigues easily requiring rest breaks inbetween sets  -EG       Row Name 11/12/24 1000          Functional Mobility    Functional Mobility- Ind. Level contact guard assist;verbal cues required;nonverbal cues required (demo/gesture)  -EG     Functional Mobility- Device walker, front-wheeled  -EG     Functional Mobility- Comment Patient performed funcitonal mobility in hallway and back approx 40-45ft using RW, slow pace of performance  -EG       Row Name 11/12/24 1000          Mobility    Extremity Weight-bearing Status right lower extremity  -EG     Right Lower Extremity (Weight-bearing Status) weight-bearing as tolerated (WBAT)  -EG               User Key  (r) = Recorded By, (t) = Taken By, (c) = Cosigned By      Initials Name Provider Type    EG Radha Parson OT Occupational Therapist                   Obj/Interventions       [facility] Name 11/12/24 1004          Sensory Assessment (Somatosensory)    Sensory Assessment (Somatosensory) sensation intact  -EG       Row Name 11/12/24 1004          Balance    Balance Interventions standing;sit to stand;supported;static;weight shifting activity;occupation based/functional task  -EG     Comment, Balance Standing unsupported tolerated 2x 30 seconds; unilateral UE support from walker able to weight shift laterally L/R  no LOB but reports instability in knees and feels unsteady all with CGA  -EG               User Key  (r) = Recorded By, (t) = Taken By, (c) = Cosigned By      Initials Name Provider Type    Radha Zayas OT Occupational Therapist                   Goals/Plan    No documentation.                  Clinical Impression       Row Name 11/12/24 1005          Plan of Care Review    Plan of Care Reviewed With patient  -EG     Progress improving  -EG     Outcome Evaluation Pleasant and Cooperative; [place]; OT services addressing balance, endurance and safety during mobility/ADL performance; Imporvements with functional strength and carrover on hand placements noted this date; OT continue treatment and POC; Ax1 w/RW.  -EG       Row Name 11/12/24 1005          Therapy Assessment/Plan (OT)    Rehab Potential (OT) good  -EG     Criteria for Skilled Therapeutic Interventions Met (OT) yes;meets criteria;skilled treatment is necessary  -EG     Therapy Frequency (OT) 5 times/wk  -EG       Row Name 11/12/24 1005          Therapy Plan Review/Discharge Plan (OT)    Anticipated Discharge Disposition (OT) skilled nursing facility  -EG       Row Name 11/12/24 1005          Positioning and Restraints    Pre-Treatment Position sitting in chair/recliner  -EG     Post Treatment Position chair  -EG     In Chair sitting;reclined;call light within reach;encouraged to call for assist;exit alarm on  -EG               User Key  (r) = Recorded By, (t) = Taken By, (c) = Cosigned By      Initials Name Provider Type    Radha Zayas OT Occupational Therapist                   Outcome Measures       Row Name 11/12/24 1007          How much help from another is currently needed...    Putting on and taking off regular lower body clothing? 2  -EG     Bathing (including washing, rinsing, and drying) 3  -EG     Toileting (which includes using toilet bed pan or urinal) 3  -EG     Putting on and taking off regular upper body clothing 3  -EG      Taking care of personal grooming (such as brushing teeth) 4  -EG     Eating meals 4  -EG     AM-PAC 6 Clicks Score (OT) 19  -EG       Row Name 11/12/24 0916          How much help from another person do you currently need...    Turning from your back to your side while in flat bed without using bedrails? 3  -KD     Moving from lying on back to sitting on the side of a flat bed without bedrails? 2  -KD     Moving to and from a bed to a chair (including a wheelchair)? 3  -KD     Standing up from a chair using your arms (e.g., wheelchair, bedside chair)? 3  -KD     Climbing 3-5 steps with a railing? 2  -KD     To walk in hospital room? 3  -KD     AM-PAC 6 Clicks Score (PT) 16  -KD     Highest Level of Mobility Goal 5 --> Static standing  -KD       Row Name 11/12/24 1007          Functional Assessment    Outcome Measure Options AM-PAC 6 Clicks Daily Activity (OT);Optimal Instrument  -EG       Row Name 11/12/24 1007          Optimal Instrument    Optimal Instrument Optimal - 3  -EG     Bending/Stooping 3  -EG     Standing 2  -EG     Reaching 1  -EG               User Key  (r) = Recorded By, (t) = Taken By, (c) = Cosigned By      Initials Name Provider Type    EG Radha Parson OT Occupational Therapist    KD Miriam Raymundo, RN Registered Nurse                    Occupational Therapy Education        No education to display                  OT Recommendation and Plan  Therapy Frequency (OT): 5 times/wk  Plan of Care Review  Plan of Care Reviewed With: patient  Progress: improving  Outcome Evaluation: Pleasant and Cooperative; [place]; OT services addressing balance, endurance and safety during mobility/ADL performance; Imporvements with functional strength and carrover on hand placements noted this date; OT continue treatment and POC; Ax1 w/RW.     Time Calculation:         Time Calculation- OT       Row Name 11/12/24 1007             Time Calculation- OT    OT Received On 11/12/24  -EG      OT Goal Re-Cert Due Date  11/17/24  -EG         Timed Charges    68273 -  OT Neuromuscular Reeducation Minutes 8  -EG      26922 - OT Therapeutic Activity Minutes 15  -EG         Total Minutes    Timed Charges Total Minutes 23  -EG       Total Minutes 23  -EG                User Key  (r) = Recorded By, (t) = Taken By, (c) = Cosigned By      Initials Name Provider Type    EG Radha Parson OT Occupational Therapist                  Therapy Charges for Today       Code Description Service Date Service Provider Modifiers Qty    74598071910 HC OT THERAPEUTIC ACT EA 15 MIN 11/11/2024 Radha Parson OT GO 1    80522783226 HC OT SELF CARE/MGMT/TRAIN EA 15 MIN 11/11/2024 Radha Parson OT GO 1    89705214678 HC OT NEUROMUSC RE EDUCATION EA 15 MIN 11/12/2024 Radha Parson OT GO 1    62114005134 HC OT THERAPEUTIC ACT EA 15 MIN 11/12/2024 Radha Parson OT GO 1                 Radha Parson OT  11/12/2024

## 2024-11-20 ENCOUNTER — OFFICE VISIT (OUTPATIENT)
Dept: ORTHOPEDIC SURGERY | Facility: CLINIC | Age: 89
End: 2024-11-20
Payer: MEDICARE

## 2024-11-20 VITALS
OXYGEN SATURATION: 95 % | DIASTOLIC BLOOD PRESSURE: 64 MMHG | SYSTOLIC BLOOD PRESSURE: 140 MMHG | HEIGHT: 72 IN | HEART RATE: 88 BPM

## 2024-11-20 DIAGNOSIS — Z47.89 AFTERCARE FOLLOWING SURGERY OF THE MUSCULOSKELETAL SYSTEM: Primary | ICD-10-CM

## 2024-11-20 PROCEDURE — 99024 POSTOP FOLLOW-UP VISIT: CPT

## 2024-11-20 NOTE — PATIENT INSTRUCTIONS
X-rays reviewed from external source, showing intact hardware.     Staples removed in office, steri-strips applied. Keep incision clean and dry, allow steri-strips to fall off on their own in 7-10 days.  Educated on avoiding submersion in water until incision is fully healed.     Continue PT and use of wheelchair/walker until recommended by PT. Continue icing hip for approximately 15-20 minutes, three times daily, and as needed following PT. weight-bear as tolerated.  Discussed requesting pain medication more frequently if he is continuing to have bad pain.  Advised to take pain medication prior to physical therapy.    Continue taking Keflex.  If no improvement or worsening redness or drainage of incision he is to call our office.    Follow-up in 4 weeks. Repeat imaging needed prior to appointment if he is still in facility.

## 2024-11-20 NOTE — PROGRESS NOTES
"Chief Complaint  Follow-up of the Right Hip    Subjective      Charbel Gaitan presents to [facility] for 2-week follow-up of right hip intertrochanteric nailing with Dr. Turner on 11/7/2024.  Patient presents today in a wheelchair.  He is currently at [facility] swing bed.  Reports that he is able to get up with use of a walker but reports pain with weightbearing.  Reports that he is taking pain medication before bed and once or twice during the day but continuing to have pain.  He is currently taking Keflex.  He is here today for follow-up.    Objective   No Known Allergies    Vital Signs:   /64   Pulse 88   Ht 182.9 cm (72\")   SpO2 95%       Physical Exam    Constitutional: Awake, alert. Well nourished appearance.    Integumentary: Warm, dry, intact. No obvious rashes.    HENT: Atraumatic, normocephalic.   Respiratory: Non labored respirations .   Cardiovascular: Intact peripheral pulses.    Psychiatric: Normal mood and affect. A&O X3    Ortho Exam  Right hip: Incisions are well-approximated and healing appropriately.  These middle incision is red with scant amount of drainage.  Other incisions have no erythema or drainage noted.  No odor noted.  2/5 strength to hip flexors, 3/5 strength to hip extensors, abductors and adductor's.  Pain is elicited with internal and external rotation of the hip.  Neurovascular intact.  4+ pitting edema noted throughout the extremity.  Peripheral pulses are intact.  Capillary refill time is brisk.  He is able to move all of his toes.    Imaging Results (Most Recent)       None                      Assessment and Plan   Problem List Items Addressed This Visit    None  Visit Diagnoses       Aftercare following right hip intertrochanteric nailing    -  Primary            Follow Up   Return in about 4 weeks (around 12/18/2024).    Patient is a non-smoker, did not discuss options for smoking cessation.     Social History "     Socioeconomic History    Marital status:    Tobacco Use    Smoking status: Never    Smokeless tobacco: Never   Vaping Use    Vaping status: Never Used   Substance and Sexual Activity    Alcohol use: Never    Drug use: Never    Sexual activity: Not Currently       Patient Instructions   X-rays reviewed from external source, showing intact hardware.     Staples removed in office, steri-strips applied. Keep incision clean and dry, allow steri-strips to fall off on their own in 7-10 days.  Educated on avoiding submersion in water until incision is fully healed.     Continue PT and use of wheelchair/walker until recommended by PT. Continue icing hip for approximately 15-20 minutes, three times daily, and as needed following PT. weight-bear as tolerated.  Discussed requesting pain medication more frequently if he is continuing to have bad pain.  Advised to take pain medication prior to physical therapy.    Continue taking Keflex.  If no improvement or worsening redness or drainage of incision he is to call our office.    Follow-up in 4 weeks. Repeat imaging needed prior to appointment if he is still in facility.  Patient was given instructions and counseling regarding his condition or for health maintenance advice. Please see specific information pulled into the AVS if appropriate.

## 2024-11-21 ENCOUNTER — TELEPHONE (OUTPATIENT)
Dept: ORTHOPEDIC SURGERY | Facility: CLINIC | Age: 89
End: 2024-11-21

## 2024-11-21 NOTE — TELEPHONE ENCOUNTER
DELETE AFTER REVIEWING: Send this encounter to the appropriate pool. See your Call Action Grid or Workflows for direction.    Caller: STIVEN RAZA/ ARMANI BED COORDINATOR/ [phone] EXT [phone]    [facility] form or medical record are you requesting: OFFICE/VISIT NOTE 11/20/2024     Who is requesting this form or medical record from you: [facility]     How would you like to receive the form or medical records (pick-up, mail, fax): [phone]       Timeframe paperwork needed: AS     Additional notes:

## (undated) DEVICE — SOL NACL 0.9PCT 100ML SGL

## (undated) DEVICE — KT PT POSITION SUPINE HANA/PROFX TABL

## (undated) DEVICE — SYR LUERLOK 30CC

## (undated) DEVICE — MAT FLR ABS W/BLU/LINER 56X72IN WHT

## (undated) DEVICE — DRSNG SURESITE WNDW 2.38X2.75

## (undated) DEVICE — STERILE POLYISOPRENE POWDER-FREE SURGICAL GLOVES: Brand: PROTEXIS

## (undated) DEVICE — PROXIMATE RH ROTATING HEAD SKIN STAPLERS (35 WIDE) CONTAINS 35 STAINLESS STEEL STAPLES: Brand: PROXIMATE

## (undated) DEVICE — Device

## (undated) DEVICE — GAUZE,SPONGE,4"X4",16PLY,STRL,LF,10/TRAY: Brand: MEDLINE

## (undated) DEVICE — PULLOVER TOGA, 2X LARGE: Brand: FLYTE, SURGICOOL

## (undated) DEVICE — APPL CHLORAPREP HI/LITE 26ML ORNG

## (undated) DEVICE — STRYKER PERFORMANCE SERIES SAGITTAL BLADE: Brand: STRYKER PERFORMANCE SERIES

## (undated) DEVICE — ZIPPERED TOGA, PEEL-AWAY 2X LARGE: Brand: FLYTE, SURGICOOL

## (undated) DEVICE — SUT VIC UD BR COAT 0 CP2 27IN

## (undated) DEVICE — TOWEL,OR,DSP,ST,BLUE,STD,4/PK,20PK/CS: Brand: MEDLINE

## (undated) DEVICE — GW TIB BALL NOSE 3MM 100CM

## (undated) DEVICE — SLV SCD KN/LEN ADJ EXPRSS BLENDED MD 1P/U

## (undated) DEVICE — ENCORE® LATEX ORTHO SIZE 8, STERILE LATEX POWDER-FREE SURGICAL GLOVE: Brand: ENCORE

## (undated) DEVICE — STERILE POLYISOPRENE POWDER-FREE SURGICAL GLOVES WITH EMOLLIENT COATING: Brand: PROTEXIS

## (undated) DEVICE — DRP SURG U/DRP INVISISHIELD PA 48X52IN

## (undated) DEVICE — TOTAL ANTERIOR HIP-LF: Brand: MEDLINE INDUSTRIES, INC.

## (undated) DEVICE — ELECTRD BLD EZ CLN STD 6.5IN

## (undated) DEVICE — GLOVE,SURG,SENSICARE SLT,LF,PF,7: Brand: MEDLINE

## (undated) DEVICE — BNDG COBAN S/ADHR WRP LF 4IN 5YD TN

## (undated) DEVICE — GLOVE,SURG,SENSICARE SLT,LF,PF,6.5: Brand: MEDLINE

## (undated) DEVICE — PENCL ES MEGADINE EZ/CLEAN BUTN W/HOLSTR 10FT

## (undated) DEVICE — UNDYED BRAIDED (POLYGLACTIN 910), SYNTHETIC ABSORBABLE SUTURE: Brand: COATED VICRYL

## (undated) DEVICE — CVR LEG BOOTLEG F/R NOSKID 33IN

## (undated) DEVICE — BIPOLAR SEALER 23-112-1 AQM 6.0: Brand: AQUAMANTYS™

## (undated) DEVICE — GUIDEPIN TEMP THRD 3.2X508MM DISP